# Patient Record
Sex: MALE | Race: WHITE | NOT HISPANIC OR LATINO | URBAN - METROPOLITAN AREA
[De-identification: names, ages, dates, MRNs, and addresses within clinical notes are randomized per-mention and may not be internally consistent; named-entity substitution may affect disease eponyms.]

---

## 2023-09-28 LAB
ALANINE AMINOTRANSFERASE (SGPT) (U/L) IN SER/PLAS: 9 U/L (ref 10–52)
ALBUMIN (G/DL) IN SER/PLAS: 3.6 G/DL (ref 3.4–5)
ALKALINE PHOSPHATASE (U/L) IN SER/PLAS: 46 U/L (ref 33–136)
ANION GAP IN SER/PLAS: 9 MMOL/L (ref 10–20)
APPEARANCE, URINE: CLEAR
ASPARTATE AMINOTRANSFERASE (SGOT) (U/L) IN SER/PLAS: 22 U/L (ref 9–39)
BASOPHILS (10*3/UL) IN BLOOD BY AUTOMATED COUNT: 0.04 X10E9/L (ref 0–0.1)
BASOPHILS/100 LEUKOCYTES IN BLOOD BY AUTOMATED COUNT: 0.9 % (ref 0–2)
BILIRUBIN TOTAL (MG/DL) IN SER/PLAS: 1 MG/DL (ref 0–1.2)
BILIRUBIN, URINE: NEGATIVE
BLOOD, URINE: ABNORMAL
CALCIUM (MG/DL) IN SER/PLAS: 9.2 MG/DL (ref 8.6–10.3)
CARBON DIOXIDE, TOTAL (MMOL/L) IN SER/PLAS: 26 MMOL/L (ref 21–32)
CHLORIDE (MMOL/L) IN SER/PLAS: 91 MMOL/L (ref 98–107)
COLOR, URINE: YELLOW
CREATININE (MG/DL) IN SER/PLAS: 0.69 MG/DL (ref 0.5–1.3)
EOSINOPHILS (10*3/UL) IN BLOOD BY AUTOMATED COUNT: 0.33 X10E9/L (ref 0–0.7)
EOSINOPHILS/100 LEUKOCYTES IN BLOOD BY AUTOMATED COUNT: 7.7 % (ref 0–6)
ERYTHROCYTE DISTRIBUTION WIDTH (RATIO) BY AUTOMATED COUNT: 12.2 % (ref 11.5–14.5)
ERYTHROCYTE MEAN CORPUSCULAR HEMOGLOBIN CONCENTRATION (G/DL) BY AUTOMATED: 34.3 G/DL (ref 32–36)
ERYTHROCYTE MEAN CORPUSCULAR VOLUME (FL) BY AUTOMATED COUNT: 87 FL (ref 80–100)
ERYTHROCYTES (10*6/UL) IN BLOOD BY AUTOMATED COUNT: 4.49 X10E12/L (ref 4.5–5.9)
GFR MALE: >90 ML/MIN/1.73M2
GLUCOSE (MG/DL) IN SER/PLAS: 84 MG/DL (ref 74–99)
GLUCOSE, URINE: NEGATIVE MG/DL
HEMATOCRIT (%) IN BLOOD BY AUTOMATED COUNT: 39.1 % (ref 41–52)
HEMOGLOBIN (G/DL) IN BLOOD: 13.4 G/DL (ref 13.5–17.5)
IMMATURE GRANULOCYTES/100 LEUKOCYTES IN BLOOD BY AUTOMATED COUNT: 0.2 % (ref 0–0.9)
KETONES, URINE: NEGATIVE MG/DL
LEUKOCYTE ESTERASE, URINE: NEGATIVE
LEUKOCYTES (10*3/UL) IN BLOOD BY AUTOMATED COUNT: 4.3 X10E9/L (ref 4.4–11.3)
LYMPHOCYTES (10*3/UL) IN BLOOD BY AUTOMATED COUNT: 1.67 X10E9/L (ref 1.2–4.8)
LYMPHOCYTES/100 LEUKOCYTES IN BLOOD BY AUTOMATED COUNT: 38.9 % (ref 13–44)
MAGNESIUM (MG/DL) IN SER/PLAS: 1.35 MG/DL (ref 1.6–2.4)
MONOCYTES (10*3/UL) IN BLOOD BY AUTOMATED COUNT: 0.73 X10E9/L (ref 0.1–1)
MONOCYTES/100 LEUKOCYTES IN BLOOD BY AUTOMATED COUNT: 17 % (ref 2–10)
NATRIURETIC PEPTIDE B (PG/ML) IN SER/PLAS: 26 PG/ML (ref 0–99)
NEUTROPHILS (10*3/UL) IN BLOOD BY AUTOMATED COUNT: 1.51 X10E9/L (ref 1.2–7.7)
NEUTROPHILS/100 LEUKOCYTES IN BLOOD BY AUTOMATED COUNT: 35.3 % (ref 40–80)
NITRITE, URINE: NEGATIVE
NRBC (PER 100 WBCS) BY AUTOMATED COUNT: 0 /100 WBC (ref 0–0)
PH, URINE: 6 (ref 5–8)
PLATELETS (10*3/UL) IN BLOOD AUTOMATED COUNT: 176 X10E9/L (ref 150–450)
POTASSIUM (MMOL/L) IN SER/PLAS: 4 MMOL/L (ref 3.5–5.3)
PROTEIN TOTAL: 6.5 G/DL (ref 6.4–8.2)
PROTEIN, URINE: NEGATIVE MG/DL
RBC, URINE: NORMAL /HPF (ref 0–5)
SODIUM (MMOL/L) IN SER/PLAS: 122 MMOL/L (ref 136–145)
SPECIFIC GRAVITY, URINE: 1 (ref 1–1.03)
TROPONIN I, HIGH SENSITIVITY: 8 NG/L (ref 0–20)
UREA NITROGEN (MG/DL) IN SER/PLAS: 6 MG/DL (ref 6–23)
UROBILINOGEN, URINE: <2 MG/DL (ref 0–1.9)
WBC, URINE: NORMAL /HPF (ref 0–5)

## 2023-09-28 PROCEDURE — 85025 COMPLETE CBC W/AUTO DIFF WBC: CPT

## 2023-09-28 PROCEDURE — 73502 X-RAY EXAM HIP UNI 2-3 VIEWS: CPT | Mod: LT

## 2023-09-28 PROCEDURE — 96365 THER/PROPH/DIAG IV INF INIT: CPT

## 2023-09-28 PROCEDURE — 96375 TX/PRO/DX INJ NEW DRUG ADDON: CPT

## 2023-09-28 PROCEDURE — 96366 THER/PROPH/DIAG IV INF ADDON: CPT

## 2023-09-28 PROCEDURE — 87635 SARS-COV-2 COVID-19 AMP PRB: CPT

## 2023-09-28 PROCEDURE — 72125 CT NECK SPINE W/O DYE: CPT

## 2023-09-28 PROCEDURE — 70450 CT HEAD/BRAIN W/O DYE: CPT

## 2023-09-28 PROCEDURE — 83880 ASSAY OF NATRIURETIC PEPTIDE: CPT

## 2023-09-28 PROCEDURE — 83735 ASSAY OF MAGNESIUM: CPT

## 2023-09-28 PROCEDURE — 71045 X-RAY EXAM CHEST 1 VIEW: CPT

## 2023-09-28 PROCEDURE — 9990 CHARGE CONVERSION: Mod: RT

## 2023-09-28 PROCEDURE — 80053 COMPREHEN METABOLIC PANEL: CPT

## 2023-09-28 PROCEDURE — 81001 URINALYSIS AUTO W/SCOPE: CPT

## 2023-09-28 PROCEDURE — 73562 X-RAY EXAM OF KNEE 3: CPT | Mod: RT

## 2023-09-28 PROCEDURE — 84484 ASSAY OF TROPONIN QUANT: CPT | Mod: 91

## 2023-09-28 PROCEDURE — 93005 ELECTROCARDIOGRAM TRACING: CPT

## 2023-09-28 PROCEDURE — 99285 EMERGENCY DEPT VISIT HI MDM: CPT

## 2023-09-29 ENCOUNTER — HOSPITAL ENCOUNTER (INPATIENT)
Dept: DATA CONVERSION | Facility: HOSPITAL | Age: 63
LOS: 6 days | Discharge: NURSING, REHAB OR LONG-TERM CARE | DRG: 644 | End: 2023-10-05
Attending: INTERNAL MEDICINE | Admitting: INTERNAL MEDICINE
Payer: MEDICARE

## 2023-09-29 DIAGNOSIS — E22.2 SIADH (SYNDROME OF INAPPROPRIATE ADH PRODUCTION) (MULTI): ICD-10-CM

## 2023-09-29 DIAGNOSIS — R53.1 WEAKNESS: Primary | ICD-10-CM

## 2023-09-29 DIAGNOSIS — E83.42 HYPOMAGNESEMIA: ICD-10-CM

## 2023-09-29 LAB
ANION GAP IN SER/PLAS: 11 MMOL/L (ref 10–20)
ANION GAP IN SER/PLAS: 11 MMOL/L (ref 10–20)
ANION GAP IN SER/PLAS: 8 MMOL/L (ref 10–20)
ANION GAP IN SER/PLAS: NORMAL
ATRIAL RATE: 89 BPM
CALCIUM (MG/DL) IN SER/PLAS: 8.9 MG/DL (ref 8.6–10.3)
CALCIUM (MG/DL) IN SER/PLAS: 9 MG/DL (ref 8.6–10.3)
CALCIUM (MG/DL) IN SER/PLAS: 9.1 MG/DL (ref 8.6–10.3)
CALCIUM (MG/DL) IN SER/PLAS: NORMAL
CARBON DIOXIDE, TOTAL (MMOL/L) IN SER/PLAS: 22 MMOL/L (ref 21–32)
CARBON DIOXIDE, TOTAL (MMOL/L) IN SER/PLAS: 23 MMOL/L (ref 21–32)
CARBON DIOXIDE, TOTAL (MMOL/L) IN SER/PLAS: 27 MMOL/L (ref 21–32)
CARBON DIOXIDE, TOTAL (MMOL/L) IN SER/PLAS: NORMAL
CHLORIDE (MMOL/L) IN SER/PLAS: 100 MMOL/L (ref 98–107)
CHLORIDE (MMOL/L) IN SER/PLAS: 101 MMOL/L (ref 98–107)
CHLORIDE (MMOL/L) IN SER/PLAS: 101 MMOL/L (ref 98–107)
CHLORIDE (MMOL/L) IN SER/PLAS: NORMAL
CREATININE (MG/DL) IN SER/PLAS: 0.6 MG/DL (ref 0.5–1.3)
CREATININE (MG/DL) IN SER/PLAS: 0.62 MG/DL (ref 0.5–1.3)
CREATININE (MG/DL) IN SER/PLAS: 0.75 MG/DL (ref 0.5–1.3)
CREATININE (MG/DL) IN SER/PLAS: NORMAL
CREATININE (MG/DL) IN URINE: 127 MG/DL (ref 20–370)
GFR FEMALE: NORMAL
GFR MALE: >90 ML/MIN/1.73M2
GFR MALE: NORMAL
GLUCOSE (MG/DL) IN SER/PLAS: 103 MG/DL (ref 74–99)
GLUCOSE (MG/DL) IN SER/PLAS: 80 MG/DL (ref 74–99)
GLUCOSE (MG/DL) IN SER/PLAS: 80 MG/DL (ref 74–99)
GLUCOSE (MG/DL) IN SER/PLAS: NORMAL
OSMOLALITY, RANDOM URINE: 277 MOSM/KG (ref 200–1200)
OSMOLALITY, SERUM: 267 MOSM/KG H2O (ref 280–300)
P AXIS: 75 DEGREES
P OFFSET: 159 MS
P ONSET: 94 MS
PHOSPHATE (MG/DL) IN SER/PLAS: 4.2 MG/DL (ref 2.5–4.9)
POTASSIUM (MMOL/L) IN SER/PLAS: 3.7 MMOL/L (ref 3.5–5.3)
POTASSIUM (MMOL/L) IN SER/PLAS: 3.7 MMOL/L (ref 3.5–5.3)
POTASSIUM (MMOL/L) IN SER/PLAS: 4.1 MMOL/L (ref 3.5–5.3)
POTASSIUM (MMOL/L) IN SER/PLAS: NORMAL
PR INTERVAL: 264 MS
PROCALCITONIN: 0.02 NG/ML
Q ONSET: 226 MS
QRS COUNT: 15 BEATS
QRS DURATION: 86 MS
QT INTERVAL: 366 MS
QTC CALCULATION(BAZETT): 445 MS
QTC FREDERICIA: 417 MS
R AXIS: 126 DEGREES
SARS-COV-2 RESULT: NOT DETECTED
SODIUM (MMOL/L) IN SER/PLAS: 129 MMOL/L (ref 136–145)
SODIUM (MMOL/L) IN SER/PLAS: 130 MMOL/L (ref 136–145)
SODIUM (MMOL/L) IN SER/PLAS: 131 MMOL/L (ref 136–145)
SODIUM (MMOL/L) IN SER/PLAS: 132 MMOL/L (ref 136–145)
SODIUM (MMOL/L) IN SER/PLAS: NORMAL
SODIUM (MMOL/L) IN SER/PLAS: NORMAL
SODIUM URINE RANDOM: 63 MMOL/L
SODIUM/CREATININE (MMOL/G) IN URINE: 50 MMOL/G CREAT
T AXIS: 47 DEGREES
T OFFSET: 409 MS
TROPONIN I, HIGH SENSITIVITY: 7 NG/L (ref 0–20)
TROPONIN I, HIGH SENSITIVITY: NORMAL
UREA NITROGEN (MG/DL) IN SER/PLAS: 3 MG/DL (ref 6–23)
UREA NITROGEN (MG/DL) IN SER/PLAS: NORMAL
VENTRICULAR RATE: 89 BPM

## 2023-09-29 PROCEDURE — 84484 ASSAY OF TROPONIN QUANT: CPT

## 2023-09-29 PROCEDURE — 83880 ASSAY OF NATRIURETIC PEPTIDE: CPT

## 2023-09-29 PROCEDURE — 96366 THER/PROPH/DIAG IV INF ADDON: CPT

## 2023-09-29 PROCEDURE — 96365 THER/PROPH/DIAG IV INF INIT: CPT

## 2023-09-29 PROCEDURE — 83935 ASSAY OF URINE OSMOLALITY: CPT

## 2023-09-29 PROCEDURE — 72125 CT NECK SPINE W/O DYE: CPT

## 2023-09-29 PROCEDURE — 99285 EMERGENCY DEPT VISIT HI MDM: CPT

## 2023-09-29 PROCEDURE — 97530 THERAPEUTIC ACTIVITIES: CPT | Mod: GP

## 2023-09-29 PROCEDURE — 84145 PROCALCITONIN (PCT): CPT | Mod: 90

## 2023-09-29 PROCEDURE — 97165 OT EVAL LOW COMPLEX 30 MIN: CPT | Mod: GO

## 2023-09-29 PROCEDURE — 97112 NEUROMUSCULAR REEDUCATION: CPT | Mod: GO

## 2023-09-29 PROCEDURE — 97161 PT EVAL LOW COMPLEX 20 MIN: CPT | Mod: GP

## 2023-09-29 PROCEDURE — 84300 ASSAY OF URINE SODIUM: CPT

## 2023-09-29 PROCEDURE — 85025 COMPLETE CBC W/AUTO DIFF WBC: CPT

## 2023-09-29 PROCEDURE — 9990 CHARGE CONVERSION: Mod: 90

## 2023-09-29 PROCEDURE — 94640 AIRWAY INHALATION TREATMENT: CPT

## 2023-09-29 PROCEDURE — 73502 X-RAY EXAM HIP UNI 2-3 VIEWS: CPT | Mod: LT

## 2023-09-29 PROCEDURE — 71045 X-RAY EXAM CHEST 1 VIEW: CPT

## 2023-09-29 PROCEDURE — 80053 COMPREHEN METABOLIC PANEL: CPT

## 2023-09-29 PROCEDURE — 93005 ELECTROCARDIOGRAM TRACING: CPT

## 2023-09-29 PROCEDURE — 84100 ASSAY OF PHOSPHORUS: CPT

## 2023-09-29 PROCEDURE — 70450 CT HEAD/BRAIN W/O DYE: CPT

## 2023-09-29 PROCEDURE — 81001 URINALYSIS AUTO W/SCOPE: CPT

## 2023-09-29 PROCEDURE — 96375 TX/PRO/DX INJ NEW DRUG ADDON: CPT

## 2023-09-29 PROCEDURE — 82570 ASSAY OF URINE CREATININE: CPT

## 2023-09-29 PROCEDURE — 83930 ASSAY OF BLOOD OSMOLALITY: CPT

## 2023-09-29 PROCEDURE — 87635 SARS-COV-2 COVID-19 AMP PRB: CPT

## 2023-09-29 PROCEDURE — 80048 BASIC METABOLIC PNL TOTAL CA: CPT | Mod: 91

## 2023-09-29 PROCEDURE — 84295 ASSAY OF SERUM SODIUM: CPT | Mod: 91

## 2023-09-29 PROCEDURE — 36415 COLL VENOUS BLD VENIPUNCTURE: CPT | Mod: 91

## 2023-09-29 PROCEDURE — 83735 ASSAY OF MAGNESIUM: CPT

## 2023-09-29 PROCEDURE — 73562 X-RAY EXAM OF KNEE 3: CPT | Mod: RT

## 2023-09-29 RX ORDER — FLUTICASONE FUROATE AND VILANTEROL 200; 25 UG/1; UG/1
1 POWDER RESPIRATORY (INHALATION) DAILY
Status: DISCONTINUED | OUTPATIENT
Start: 2023-09-30 | End: 2023-09-30

## 2023-09-29 RX ORDER — ATORVASTATIN CALCIUM 40 MG/1
40 TABLET, FILM COATED ORAL DAILY
Status: DISCONTINUED | OUTPATIENT
Start: 2023-09-30 | End: 2023-10-01

## 2023-09-29 RX ORDER — TOLTERODINE TARTRATE 1 MG/1
1 TABLET, EXTENDED RELEASE ORAL 2 TIMES DAILY
COMMUNITY

## 2023-09-29 RX ORDER — POTASSIUM CHLORIDE 20 MEQ/1
20 TABLET, EXTENDED RELEASE ORAL DAILY
Status: DISCONTINUED | OUTPATIENT
Start: 2023-09-30 | End: 2023-09-30

## 2023-09-29 RX ORDER — BUDESONIDE 0.5 MG/2ML
0.5 INHALANT ORAL
Status: DISCONTINUED | OUTPATIENT
Start: 2023-09-30 | End: 2023-10-05 | Stop reason: HOSPADM

## 2023-09-29 RX ORDER — OXYCODONE HYDROCHLORIDE 10 MG/1
10 TABLET ORAL EVERY 8 HOURS PRN
Status: DISCONTINUED | OUTPATIENT
Start: 2023-09-30 | End: 2023-10-05 | Stop reason: HOSPADM

## 2023-09-29 RX ORDER — LIDOCAINE 560 MG/1
1 PATCH PERCUTANEOUS; TOPICAL; TRANSDERMAL DAILY
Status: DISCONTINUED | OUTPATIENT
Start: 2023-09-30 | End: 2023-10-05 | Stop reason: HOSPADM

## 2023-09-29 RX ORDER — PREGABALIN 150 MG/1
150 CAPSULE ORAL 2 TIMES DAILY
COMMUNITY

## 2023-09-29 RX ORDER — OXYBUTYNIN CHLORIDE 5 MG/1
5 TABLET ORAL 2 TIMES DAILY
Status: DISCONTINUED | OUTPATIENT
Start: 2023-09-30 | End: 2023-10-05 | Stop reason: HOSPADM

## 2023-09-29 RX ORDER — PANTOPRAZOLE SODIUM 40 MG/1
40 TABLET, DELAYED RELEASE ORAL
Status: DISCONTINUED | OUTPATIENT
Start: 2023-09-30 | End: 2023-09-30

## 2023-09-29 RX ORDER — AMOXICILLIN 250 MG
2 CAPSULE ORAL 2 TIMES DAILY PRN
Status: DISCONTINUED | OUTPATIENT
Start: 2023-09-30 | End: 2023-10-01

## 2023-09-29 RX ORDER — IBUPROFEN 200 MG
1 TABLET ORAL DAILY
Status: DISCONTINUED | OUTPATIENT
Start: 2023-09-30 | End: 2023-10-05 | Stop reason: HOSPADM

## 2023-09-29 RX ORDER — BUDESONIDE, GLYCOPYRROLATE, AND FORMOTEROL FUMARATE 160; 9; 4.8 UG/1; UG/1; UG/1
2 AEROSOL, METERED RESPIRATORY (INHALATION)
COMMUNITY

## 2023-09-29 RX ORDER — METOPROLOL SUCCINATE 25 MG/1
25 TABLET, EXTENDED RELEASE ORAL DAILY
Status: DISCONTINUED | OUTPATIENT
Start: 2023-09-30 | End: 2023-10-01

## 2023-09-29 RX ORDER — DEXTROSE MONOHYDRATE 50 MG/ML
130 INJECTION, SOLUTION INTRAVENOUS CONTINUOUS
Status: DISCONTINUED | OUTPATIENT
Start: 2023-09-30 | End: 2023-09-30

## 2023-09-29 RX ORDER — ATORVASTATIN CALCIUM 40 MG/1
40 TABLET, FILM COATED ORAL DAILY
COMMUNITY

## 2023-09-29 RX ORDER — OXYCODONE HYDROCHLORIDE 5 MG/1
5 TABLET ORAL EVERY 8 HOURS PRN
Status: DISCONTINUED | OUTPATIENT
Start: 2023-09-30 | End: 2023-10-01

## 2023-09-29 RX ORDER — FLUTICASONE PROPIONATE 50 MCG
1 SPRAY, SUSPENSION (ML) NASAL DAILY
COMMUNITY

## 2023-09-29 RX ORDER — SILDENAFIL 100 MG/1
100 TABLET, FILM COATED ORAL DAILY PRN
COMMUNITY

## 2023-09-29 RX ORDER — PREGABALIN 150 MG/1
150 CAPSULE ORAL 2 TIMES DAILY
Status: DISCONTINUED | OUTPATIENT
Start: 2023-09-30 | End: 2023-10-01

## 2023-09-29 RX ORDER — LORATADINE 10 MG/1
10 TABLET ORAL DAILY
COMMUNITY

## 2023-09-29 RX ORDER — ALBUTEROL SULFATE 90 UG/1
2 AEROSOL, METERED RESPIRATORY (INHALATION) EVERY 6 HOURS PRN
COMMUNITY

## 2023-09-29 RX ORDER — ACETAMINOPHEN 325 MG/1
650 TABLET ORAL EVERY 6 HOURS PRN
Status: DISCONTINUED | OUTPATIENT
Start: 2023-09-30 | End: 2023-10-05 | Stop reason: HOSPADM

## 2023-09-29 RX ORDER — METOPROLOL SUCCINATE 25 MG/1
25 TABLET, EXTENDED RELEASE ORAL DAILY
COMMUNITY

## 2023-09-29 RX ORDER — ALBUTEROL SULFATE 0.83 MG/ML
3 SOLUTION RESPIRATORY (INHALATION) EVERY 6 HOURS PRN
Status: DISCONTINUED | OUTPATIENT
Start: 2023-09-30 | End: 2023-10-01

## 2023-09-29 RX ORDER — LORATADINE 10 MG/1
10 TABLET ORAL DAILY
Status: DISCONTINUED | OUTPATIENT
Start: 2023-09-30 | End: 2023-10-05 | Stop reason: HOSPADM

## 2023-09-29 RX ORDER — FLUTICASONE PROPIONATE 50 MCG
2 SPRAY, SUSPENSION (ML) NASAL DAILY
Status: DISCONTINUED | OUTPATIENT
Start: 2023-09-30 | End: 2023-09-30

## 2023-09-29 RX ORDER — OMEPRAZOLE 20 MG/1
20 CAPSULE, DELAYED RELEASE ORAL DAILY
COMMUNITY

## 2023-09-29 RX ORDER — OXYCODONE HYDROCHLORIDE 10 MG/1
10 TABLET ORAL EVERY 8 HOURS PRN
COMMUNITY

## 2023-09-29 RX ORDER — AMOXICILLIN 250 MG
2 CAPSULE ORAL 2 TIMES DAILY PRN
COMMUNITY

## 2023-09-30 LAB
ALBUMIN SERPL BCP-MCNC: 3.2 G/DL (ref 3.4–5)
ANION GAP SERPL CALC-SCNC: 11 MMOL/L (ref 10–20)
ANION GAP SERPL CALC-SCNC: 12 MMOL/L
ANION GAP SERPL CALC-SCNC: 13 MMOL/L (ref 10–20)
BUN SERPL-MCNC: 4 MG/DL (ref 6–23)
BUN SERPL-MCNC: 4 MG/DL (ref 6–23)
BUN SERPL-MCNC: 5 MG/DL (ref 6–23)
CALCIUM SERPL-MCNC: 9 MG/DL (ref 8.6–10.3)
CALCIUM SERPL-MCNC: 9.2 MG/DL (ref 8.6–10.3)
CALCIUM SERPL-MCNC: 9.3 MG/DL (ref 8.6–10.3)
CHLORIDE SERPL-SCNC: 100 MMOL/L (ref 98–107)
CHLORIDE SERPL-SCNC: 101 MMOL/L (ref 98–107)
CHLORIDE SERPL-SCNC: 103 MMOL/L (ref 98–107)
CO2 SERPL-SCNC: 20 MMOL/L (ref 21–32)
CO2 SERPL-SCNC: 22 MMOL/L (ref 21–32)
CO2 SERPL-SCNC: 24 MMOL/L (ref 21–32)
CREAT SERPL-MCNC: 0.69 MG/DL (ref 0.5–1.3)
CREAT SERPL-MCNC: 0.7 MG/DL (ref 0.5–1.3)
CREAT SERPL-MCNC: 0.77 MG/DL (ref 0.5–1.3)
ERYTHROCYTE [DISTWIDTH] IN BLOOD BY AUTOMATED COUNT: 12.5 % (ref 11.5–14.5)
GFR SERPL CREATININE-BSD FRML MDRD: >90 ML/MIN/1.73M*2
GLUCOSE SERPL-MCNC: 114 MG/DL (ref 74–99)
GLUCOSE SERPL-MCNC: 87 MG/DL (ref 74–99)
GLUCOSE SERPL-MCNC: 92 MG/DL (ref 74–99)
HCT VFR BLD AUTO: 42 % (ref 41–52)
HGB BLD-MCNC: 13.7 G/DL (ref 13.5–17.5)
LACTATE SERPL-SCNC: 1.1 MMOL/L (ref 0.4–2)
MAGNESIUM SERPL-MCNC: 1.4 MG/DL (ref 1.6–2.4)
MCH RBC QN AUTO: 30 PG (ref 26–34)
MCHC RBC AUTO-ENTMCNC: 32.6 G/DL (ref 32–36)
MCV RBC AUTO: 92 FL (ref 80–100)
NRBC BLD-RTO: 0 /100 WBCS (ref 0–0)
OSMOLALITY, RANDOM URINE: NORMAL
PHOSPHATE SERPL-MCNC: 3.9 MG/DL (ref 2.5–4.9)
PLATELET # BLD AUTO: 177 X10*3/UL (ref 150–450)
PMV BLD AUTO: 10.3 FL (ref 7.5–11.5)
POTASSIUM SERPL-SCNC: 4.1 MMOL/L (ref 3.5–5.3)
POTASSIUM SERPL-SCNC: 4.2 MMOL/L (ref 3.5–5.3)
POTASSIUM SERPL-SCNC: 4.6 MMOL/L (ref 3.5–5.3)
RBC # BLD AUTO: 4.56 X10*6/UL (ref 4.5–5.9)
SODIUM SERPL-SCNC: 129 MMOL/L (ref 136–145)
SODIUM SERPL-SCNC: 130 MMOL/L (ref 136–145)
SODIUM SERPL-SCNC: 134 MMOL/L (ref 136–145)
TSH SERPL-ACNC: 4 MIU/L (ref 0.44–3.98)
WBC # BLD AUTO: 5 X10*3/UL (ref 4.4–11.3)

## 2023-09-30 PROCEDURE — 84443 ASSAY THYROID STIM HORMONE: CPT | Performed by: INTERNAL MEDICINE

## 2023-09-30 PROCEDURE — 84300 ASSAY OF URINE SODIUM: CPT

## 2023-09-30 PROCEDURE — 83935 ASSAY OF URINE OSMOLALITY: CPT

## 2023-09-30 PROCEDURE — 83605 ASSAY OF LACTIC ACID: CPT | Performed by: INTERNAL MEDICINE

## 2023-09-30 PROCEDURE — 83036 HEMOGLOBIN GLYCOSYLATED A1C: CPT | Performed by: INTERNAL MEDICINE

## 2023-09-30 PROCEDURE — 1200000002 HC GENERAL ROOM WITH TELEMETRY DAILY

## 2023-09-30 PROCEDURE — 80069 RENAL FUNCTION PANEL: CPT | Performed by: INTERNAL MEDICINE

## 2023-09-30 PROCEDURE — 36415 COLL VENOUS BLD VENIPUNCTURE: CPT

## 2023-09-30 PROCEDURE — 2500000004 HC RX 250 GENERAL PHARMACY W/ HCPCS (ALT 636 FOR OP/ED): Performed by: INTERNAL MEDICINE

## 2023-09-30 PROCEDURE — 82306 VITAMIN D 25 HYDROXY: CPT | Performed by: INTERNAL MEDICINE

## 2023-09-30 PROCEDURE — 83930 ASSAY OF BLOOD OSMOLALITY: CPT

## 2023-09-30 PROCEDURE — 2500000001 HC RX 250 WO HCPCS SELF ADMINISTERED DRUGS (ALT 637 FOR MEDICARE OP)

## 2023-09-30 PROCEDURE — 9990 CHARGE CONVERSION: Mod: 90

## 2023-09-30 PROCEDURE — 85027 COMPLETE CBC AUTOMATED: CPT | Performed by: NURSE PRACTITIONER

## 2023-09-30 PROCEDURE — 82570 ASSAY OF URINE CREATININE: CPT

## 2023-09-30 PROCEDURE — 2500000002 HC RX 250 W HCPCS SELF ADMINISTERED DRUGS (ALT 637 FOR MEDICARE OP, ALT 636 FOR OP/ED): Performed by: INTERNAL MEDICINE

## 2023-09-30 PROCEDURE — 80048 BASIC METABOLIC PNL TOTAL CA: CPT | Mod: 91

## 2023-09-30 PROCEDURE — 87040 BLOOD CULTURE FOR BACTERIA: CPT | Performed by: INTERNAL MEDICINE

## 2023-09-30 PROCEDURE — 83735 ASSAY OF MAGNESIUM: CPT | Performed by: INTERNAL MEDICINE

## 2023-09-30 PROCEDURE — 82533 TOTAL CORTISOL: CPT | Performed by: INTERNAL MEDICINE

## 2023-09-30 PROCEDURE — 84295 ASSAY OF SERUM SODIUM: CPT | Mod: 91

## 2023-09-30 PROCEDURE — 84100 ASSAY OF PHOSPHORUS: CPT

## 2023-09-30 PROCEDURE — 82607 VITAMIN B-12: CPT | Performed by: INTERNAL MEDICINE

## 2023-09-30 PROCEDURE — 82533 TOTAL CORTISOL: CPT | Mod: STJLAB | Performed by: INTERNAL MEDICINE

## 2023-09-30 PROCEDURE — 82746 ASSAY OF FOLIC ACID SERUM: CPT | Performed by: INTERNAL MEDICINE

## 2023-09-30 PROCEDURE — 84145 PROCALCITONIN (PCT): CPT | Mod: 90

## 2023-09-30 PROCEDURE — 80048 BASIC METABOLIC PNL TOTAL CA: CPT | Performed by: INTERNAL MEDICINE

## 2023-09-30 PROCEDURE — 2500000004 HC RX 250 GENERAL PHARMACY W/ HCPCS (ALT 636 FOR OP/ED): Performed by: NURSE PRACTITIONER

## 2023-09-30 PROCEDURE — 84145 PROCALCITONIN (PCT): CPT | Performed by: INTERNAL MEDICINE

## 2023-09-30 PROCEDURE — 3490 HC RX 250 GENERAL PHARMACY W/ HCPCS (ALT 636 FOR OP/ED): Performed by: INTERNAL MEDICINE

## 2023-09-30 PROCEDURE — 36415 COLL VENOUS BLD VENIPUNCTURE: CPT | Performed by: INTERNAL MEDICINE

## 2023-09-30 PROCEDURE — 94640 AIRWAY INHALATION TREATMENT: CPT

## 2023-09-30 PROCEDURE — 2500000001 HC RX 250 WO HCPCS SELF ADMINISTERED DRUGS (ALT 637 FOR MEDICARE OP): Performed by: INTERNAL MEDICINE

## 2023-09-30 RX ORDER — PANTOPRAZOLE SODIUM 20 MG/1
20 TABLET, DELAYED RELEASE ORAL
Status: DISCONTINUED | OUTPATIENT
Start: 2023-09-30 | End: 2023-10-01 | Stop reason: SDUPTHER

## 2023-09-30 RX ORDER — OXYCODONE HYDROCHLORIDE 10 MG/1
TABLET ORAL
Status: COMPLETED
Start: 2023-09-30 | End: 2023-09-30

## 2023-09-30 RX ORDER — MAGNESIUM SULFATE HEPTAHYDRATE 40 MG/ML
2 INJECTION, SOLUTION INTRAVENOUS ONCE
Status: COMPLETED | OUTPATIENT
Start: 2023-09-30 | End: 2023-10-01

## 2023-09-30 RX ORDER — IPRATROPIUM BROMIDE AND ALBUTEROL SULFATE 2.5; .5 MG/3ML; MG/3ML
3 SOLUTION RESPIRATORY (INHALATION)
Status: DISCONTINUED | OUTPATIENT
Start: 2023-09-30 | End: 2023-10-05 | Stop reason: HOSPADM

## 2023-09-30 RX ORDER — OXYCODONE HYDROCHLORIDE 10 MG/1
TABLET ORAL
Status: DISCONTINUED
Start: 2023-09-30 | End: 2023-09-30 | Stop reason: WASHOUT

## 2023-09-30 RX ORDER — IPRATROPIUM BROMIDE AND ALBUTEROL SULFATE 2.5; .5 MG/3ML; MG/3ML
3 SOLUTION RESPIRATORY (INHALATION)
Status: DISCONTINUED | OUTPATIENT
Start: 2023-09-30 | End: 2023-09-30

## 2023-09-30 RX ORDER — FLUTICASONE PROPIONATE 50 MCG
1 SPRAY, SUSPENSION (ML) NASAL DAILY
Status: DISCONTINUED | OUTPATIENT
Start: 2023-09-30 | End: 2023-10-01

## 2023-09-30 RX ORDER — SODIUM CHLORIDE 9 MG/ML
75 INJECTION, SOLUTION INTRAVENOUS CONTINUOUS
Status: DISCONTINUED | OUTPATIENT
Start: 2023-09-30 | End: 2023-10-01

## 2023-09-30 RX ADMIN — RIVAROXABAN 20 MG: 20 TABLET, FILM COATED ORAL at 17:00

## 2023-09-30 RX ADMIN — OXYCODONE HYDROCHLORIDE 10 MG: 10 TABLET ORAL at 05:55

## 2023-09-30 RX ADMIN — FLUTICASONE PROPIONATE 1 SPRAY: 50 SPRAY, METERED NASAL at 08:51

## 2023-09-30 RX ADMIN — OXYBUTYNIN CHLORIDE 5 MG: 5 TABLET ORAL at 21:10

## 2023-09-30 RX ADMIN — MAGNESIUM SULFATE HEPTAHYDRATE 2 G: 40 INJECTION, SOLUTION INTRAVENOUS at 23:17

## 2023-09-30 RX ADMIN — IPRATROPIUM BROMIDE AND ALBUTEROL SULFATE 3 ML: 2.5; .5 SOLUTION RESPIRATORY (INHALATION) at 20:41

## 2023-09-30 RX ADMIN — LORATADINE 10 MG: 10 TABLET ORAL at 08:50

## 2023-09-30 RX ADMIN — PANTOPRAZOLE SODIUM 20 MG: 20 TABLET, DELAYED RELEASE ORAL at 08:50

## 2023-09-30 RX ADMIN — BUDESONIDE 0.5 MG: 0.5 INHALANT RESPIRATORY (INHALATION) at 09:00

## 2023-09-30 RX ADMIN — OXYCODONE HYDROCHLORIDE 10 MG: 10 TABLET ORAL at 19:20

## 2023-09-30 RX ADMIN — PREGABALIN 150 MG: 150 CAPSULE ORAL at 08:54

## 2023-09-30 RX ADMIN — BUDESONIDE 0.5 MG: 0.5 INHALANT RESPIRATORY (INHALATION) at 20:40

## 2023-09-30 RX ADMIN — PREGABALIN 150 MG: 150 CAPSULE ORAL at 21:10

## 2023-09-30 RX ADMIN — OXYBUTYNIN CHLORIDE 5 MG: 5 TABLET ORAL at 08:51

## 2023-09-30 RX ADMIN — IPRATROPIUM BROMIDE AND ALBUTEROL SULFATE 3 ML: 2.5; .5 SOLUTION RESPIRATORY (INHALATION) at 09:00

## 2023-09-30 RX ADMIN — METOPROLOL SUCCINATE 25 MG: 25 TABLET, EXTENDED RELEASE ORAL at 08:47

## 2023-09-30 RX ADMIN — SODIUM CHLORIDE 75 ML/HR: 9 INJECTION, SOLUTION INTRAVENOUS at 20:30

## 2023-09-30 RX ADMIN — ATORVASTATIN CALCIUM 40 MG: 40 TABLET, FILM COATED ORAL at 08:50

## 2023-09-30 ASSESSMENT — PAIN SCALES - GENERAL
PAINLEVEL_OUTOF10: 8
PAINLEVEL_OUTOF10: 0 - NO PAIN
PAINLEVEL_OUTOF10: 4

## 2023-09-30 ASSESSMENT — PAIN - FUNCTIONAL ASSESSMENT
PAIN_FUNCTIONAL_ASSESSMENT: 0-10
PAIN_FUNCTIONAL_ASSESSMENT: 0-10

## 2023-09-30 NOTE — H&P
History of Present Illness:   HPI:    WILLIE GOTTLIEB is a 62 year old Male with pertinent medical history of lung cancer with mets to the rib bone treated with chemo and radiation on 2 separate occasions  (in remission for the last 2 years-[facility]), chronic pain, CAD, HTN, HLD, anemia, hx.  DVT and PE on Eliquis who presented to [facility] ED on 9/28/2023 with complaints of multiple falls while at home over the past month attributing  it to his right knee giving out.  Patient had a fall today in which he landed on his right knee.  He denies hitting his head.  Patient is on Eliquis for history of DVT and Right PE.  Over the last month he has had increased difficulty walking, which was  worse today after his fall.  He does use a walker to walk.  In addition, he reports bilateral hip pain right greater than left.  He is also had weight loss of approximately 55 pounds since July/August.  Patient reports having decreased appetite over the  last month with increased weakness.  Patient denies recent: fever, chills, headache, dizziness, chest pain/ palpitations, shortness of breath, cough, abdominal pain, N/V/D, dysuria, or hematuria.    ED course:    Initial VS: Temperature 36.7, HR 98, RR 20, /83, SPO2 98% on room air.  CBC: WBC 4.3.  CMP: Sodium 122, chloride 91, anion gap 9, magnesium 1.35.  CT head and C-spine: No acute intercranial abnormalities.  No acute cervical spine fracture.  X-ray right knee/left hip: No acute abnormalities noted.  Chest x-ray: Suspected bronchitis versus mild edema.  Small effusions not excluded.  While in the ED patient received Norco 1 tablet p.o., morphine 4 mg IV, magnesium 2 g IV, and normal saline x500 mL.    Patient admitted inpatient for hyponatremia and weakness consult for nephrology and oncology.    Past medical history:Lung cancer with mets to the rib bone treated with chemo and radiation (in remission for about 2 years), CAD, HTN, HLD, COPD, GERD, MI  in 2009 through [facility]-cardiac cath without stents, right PE, DVT, chronic pain, abdominal rectus  sheath hematoma, anemia    Past surgical history: Cardiac catheterization in 2009 without stents, cholecystectomy, hernia repair, right knee meniscus repair, embolization of left inferior epigastric artery 2020    Social history:Tobacco use of 1 pack/day, no alcohol usage, denies illicit drug use.    Family history: Mother: Lung cancer.  Maternal side: Heart disease.    Comorbidities:   Comorbidites:  ·  Comorbid Conditions chronic obstructive pulmonary disease, hypertension   ·  COPD unknown     Social History:   Social History:  Smoking Status moderate user (uses 11-30 cig/day, OR 0.5-1.5 ppd, OR 2-3 cans/pouches loose leaf tobacco per week, OR 0.5-1.5 vape pods per day)   Alcohol Use occasionally   Drug Use denies              Adverse Events:  ·  gabapentin : Swelling/Edema    Medications Prior to Admission:   Admission Medication Reconciliation has not been completed for this patient.    Review of Systems:   Constitutional: POSITIVE: Anorexia, Weight Loss;  NEGATIVE: Fever, Chills     Eyes: NEGATIVE: Blurry Vision, Redness, Vision Loss/  Change     ENMT: NEGATIVE: Nasal Discharge, Nasal Congestion,  Throat Pain     Respiratory: NEGATIVE: Dry Cough, Productive Cough,  Shortness of Breath     Cardiac: NEGATIVE: Chest Pain, Dyspnea on Exertion,  Palpitations     Gastrointestinal: NEGATIVE: Nausea, Vomiting, Diarrhea,  Constipation, Abdominal Pain     Genitourinary: NEGATIVE: Dysuria     Musculoskeletal: POSITIVE: Decreased ROM, Pain, Swelling, Weakness     Neurological: NEGATIVE: Dizziness, Headache     Psychiatric: NEGATIVE: Anxiety     Skin: NEGATIVE: Rash, Ulcer     Endocrine: NEGATIVE: Sweat     Hematologic/Lymph: NEGATIVE: Bruising     Allergic/Immunologic: NEGATIVE: Itching       Objective:     Objective Information:      T   P  R  BP   MAP  SpO2   Value  36.7  100  18  166/97      95%  Date/Time 9/28 19:27 9/28  23:08 9/28 23:08 9/28 23:08    9/28 23:08  Range  (36.7C - 36.7C )  (98 - 100 )  (18 - 20 )  (136 - 166 )/ (83 - 97 )    (95% - 98% )      Pain reported at 9/28 23:08: 5 = Moderate     Weights   9/28 19:27: Weight in lbs ((lbs))  291  9/28 19:27: Weight in kg (Weight (kg))  132  9/28 19:27: BMI (kg/m2) (BMI (kg/m2))  39.502      EKG: Sinus rhythm with first-degree AV block, ventricular rate 89, , QRS 86, QTc 445. No ST elevation or depression noted.     Physical Exam by System:    Constitutional: Awake/alert, calm, pleasant, and  cooperative.  Clear speech. no acute distress noted.   Eyes: PERRL, clear sclera, no swelling or draining  noted   ENMT: mucous membranes pink and moist, no apparent  injury, no lesions seen   Head/Neck: Neck supple, no apparent injury, trachea  midline, no palpable masses on head.   Respiratory/Thorax: CTAB, respirations even and unlabored,  symmetrical chest rise   Cardiovascular: Regular rhythm and rate, auscultated  S1 and S2, no murmurs   Gastrointestinal: Nondistended, soft, non-tender,  no rebound tenderness or guarding, positive bowel sounds in all quadrants   Musculoskeletal: Decreased range of motion and strength  in bilateral lower extremities, no joint swelling   Extremities: BLE 3-4+ pitting edema noted, no cyanosis,  1+ pulses of the extremities, see skin assessment for wounds   Neurological: alert and oriented x3, intact senses,  bilateral hand grasp equal and foot push/pulls extremely weak and equal.   Psychological: Appropriate mood and behavior   Skin: Pink, warm, and dry.  Bilateral lower extremities  with erythema and white scaly skin     Medications:    Medications:          Continuous Medications       --------------------------------  No continuous medications are active       Scheduled Medications       --------------------------------    1. Nicotine  21 mg/ 24 hour TransDermal - PEDS:  1  patch  TransDermal  Every 24 Hours         PRN Medications        --------------------------------    1. Acetaminophen:  650  mg  Oral  Every 6 Hours    2. oxyCODONE Immediate Release:  5  mg  Oral  Every 8 Hours    3. oxyCODONE Immediate Release:  10  mg  Oral  Every 8 Hours    4. Sodium Chloride 0.9% Injectable Flush:  10  mL  IntraVenous Flush  Every 8 Hours and as Needed        Recent Lab Results:    Results:        I have reviewed these laboratory results:    Troponin I, High Sensitivity  Trending View      Result 28-Sep-2023 23:40:00  28-Sep-2023 21:03:00    Troponin I, High Sensitivity 7   8        Coronavirus 2019 by PCR  28-Sep-2023 23:37:00      Result Value    Fluid Source  Nasal, Nasopharyngeal    Coronavirus 2019,PCR  NOT DETECTED  Reference Range: Not Detected .This test has received FDA Emergency Use Authorization (EUA) and has been verified by [facility]. This test is only authorized for the duration of time that circums      Urinalysis  28-Sep-2023 23:30:00      Result Value    Color, Urine  YELLOW  Reference Range: STRAW,YELLOW    Appearance, Urine  CLEAR    Specific Gravity, Urine  1.003   L   pH, Urine  6.0    Protein, Urine  NEGATIVE    Glucose, Urine  NEGATIVE    Blood, Urine  SMALL(1+)   A   Ketones, Urine  NEGATIVE    Bilirubin, Urine  NEGATIVE    Urobilinogen, Urine  <2.0    Nitrite, Urine  NEGATIVE    Leukocyte Esterase, Urine  NEGATIVE      Urinalysis, Microscopic  28-Sep-2023 23:30:00      Result Value    White Cells  0-5    Red Blood Cells  0-5      Complete Blood Count + Differential  28-Sep-2023 21:03:00      Result Value    White Blood Cell Count  4.3   L   Nucleated Erythrocyte Count  0.0    Red Blood Cell Count  4.49   L   HGB  13.4   L   HCT  39.1   L   MCV  87    MCHC  34.3    PLT  176    RDW-CV  12.2    Neutrophil %  35.3    Immature Granulocytes %  0.2    Lymphocyte %  38.9    Monocyte %  17.0    Eosinophil %  7.7    Basophil %  0.9    Neutrophil Count  1.51    Lymphocyte Count  1.67    Monocyte Count  0.73     Eosinophil Count  0.33    Basophil Count  0.04      Comprehensive Metabolic Panel  28-Sep-2023 21:03:00      Result Value    Glucose, Serum  84    NA  122   L   K  4.0    CL  91   L   Bicarbonate, Serum  26    Anion Gap, Serum  9   L   BUN  6    CREAT  0.69    GFR Male  >90    Calcium, Serum  9.2    ALB  3.6    ALKP  46    T Pro  6.5    T Bili  1.0    Alanine Aminotransferase, Serum  9   L   Aspartate Transaminase, Serum  22      Magnesium, Serum  28-Sep-2023 21:03:00      Result Value    Magnesium, Serum  1.35   L     Brain Natriuretic Peptide  28-Sep-2023 21:03:00      Result Value    Brain Natriuretic Peptide  26        Radiology Results:    Results:        Impression:     No fracture.        Signed by Eulogio Vitale MD     Xray Knee 3 View [Sep 28 2023 10:23PM]      Impression:     No acute abnormalities are identified in the pelvis or left hip..       Signed by Red Barnett MD     Xray Hip 2 View [Sep 28 2023 10:13PM]      Impression:    Suspect bronchitis versus mild edema. Small effusions not excluded.           Signed by Dima Nieves II, MD     Xray Chest 1 View [Sep 28 2023 10:13PM]      Impression:    No findings of acute intracranial abnormality.     No findings of acute cervical spine fracture. Straightening and mild  multilevel degenerative disc disease     Malpositioned right internal jugular venous catheter. Repositioning  advised     CT C Spine without Contrast [Sep 28 2023 10:13PM]      Impression:    No findings of acute intracranial abnormality.     No findings of acute cervical spine fracture. Straightening and mild  multilevel degenerative disc disease     Malpositioned right internal jugular venous catheter. Repositioning  advised     CT Head without Contrast [Sep 28 2023 10:13PM]      Impression:  Xray Knee 3 View [Sep 28 2023  9:54PM]      Impression:  Xray Knee 3 View [Sep 28 2023  9:51PM]      Assessment and Plan:   Problem List:       Admitting Dx:   Hyponatremia:     Assessment:     Hyponatremia/hypochloremia  Leukopenia  Hypomagnesia  Frequent falls  Generalized weakness  HTN/HLD  COPD  Nicotine dependence  HX.  Lung cancer with bone metastasis-in remission-not on any chemo.  Hx. DVT and PE on Eliquis    Plan:  Consult: Nephrology, oncology.  ATB: None at this time .   IVFs: Normal saline at 100 MLS per hour x1 L .  Meds: Nicotine transdermal patch 21 mg per 24 hours, Tylenol 650 mg p.o.  every 6 hours as needed for pain 1-3, oxycodone 5 mg p.o. every 8 hours as needed for pain 4-6, oxycodone 10 mg p.o. every 8 hours as needed for pain 7-10.  OARRS verified  Diet: Cardiac .  Telemetry monitoring .  Vital signs with BP, HR, & RR Q 4 hours.  Pulse ox Q 4 hours.   Admission height and weight.  Labs ordered: CBC, BMP [id number], magnesium, phosphorus, TSH, T4, hemoglobin  A1c, vitamin D, vitamin B12, folate, procalcitonin, CRP, serum osmolarity, urine osmolarity.  Test ordered: Ultrasound of BLE .  Monitor / trend labs while inpatient.  Replace electrolytes as needed.  Aspiration precautions .  Out of bed with assistance   Fall precautions  PT/OT eval and treat as indicated .  Encourage & educate on smoking cessation .  Call physician for temperature < 36.5 or >38.0 degrees celsius.  Call physician for pulse <50 or >120.  Call physician for respiratory rate <10 or >22.  Call physician for systolic blood pressure <90 or >170.  Call physician for diastolic blood pressure < 50 or >100.  Call physician for pulse ox <92%.  See orders for further plan of care ordered.     VTE prophylaxis: Continue home Eliquis, BLE SCDs.  Monitor for s/s of bleeding    Resume home medications as appropriate when pharmacy/nursing verify and complete patient's home medication reconciliation    Further evaluation, consults, and management per attending and consulting physicians.    This note has been transcribed using Dragon voice recognition system and there is a possibility of unintentional typing misprints.  Any  information found to be copied from previous providers is done in the best interest of the patient to provide accurate,  quality, and continuity of care.       Plan of Care Reviewed With:  Plan of Care Reviewed With: patient; daughter       Electronic Signatures:  Columba Dudley (APRN-CNP)  (Signed 29-Sep-2023 01:42)   Authored: History of Present Illness, Comorbidities,  Social History, Allergies, Medications Prior to Admission, Review of Systems, Objective, Assessment and Plan, Note Completion      Last Updated: 29-Sep-2023 01:42 by Columba Dudley (APRN-CNP)

## 2023-09-30 NOTE — CARE PLAN
Plan of care transcription     Problem: PT Problem  Goal: Pt will demonstrate mod I for all bed mobility   Description: Goal transcribed from [facility]    Outcome: Progressing  Goal: Pt will demonstrate mod I for all transfers with WW  Description: Goal transcribed from [facility]    Outcome: Progressing  Goal: Pt will ambulate 50 ft with WW and Tor.   Description: Goal transcribed from [facility]    Outcome: Progressing  Goal: Pt will be able to negotiation 7 steps with mod I.  Description: Goal transcribed from [facility]    Outcome: Progressing  Goal: Pt will demonstrate BLE strength WFLs  Description: Goal transcribed from [facility]    Outcome: Progressing   Physical Therapy Treatment    Patient Name: Jarocho Luis  MRN: [id number]  Today's Date: 9/30/2023          Assessment/Plan         PT Plan  Treatment/Interventions: Bed mobility, Transfer training, Gait training, Stair training, Balance training, Strengthening, Endurance training, Therapeutic exercise, Therapeutic activity, Home exercise program  PT Frequency: 3 times per week  PT Discharge Recommendations: Moderate intensity level of continued care      Encounter Problems       Encounter Problems (Active)       PT Problem       Pt will demonstrate mod I for all bed mobility  (Progressing)       Start:  09/30/23    Expected End:  10/13/23       Goal transcribed from [facility]           Pt will demonstrate mod I for all transfers with WW (Progressing)       Start:  09/30/23    Expected End:  10/13/23       Goal transcribed from [facility]           Pt will ambulate 50 ft with WW and Tor.  (Progressing)       Start:  09/30/23    Expected End:  10/13/23       Goal transcribed from [facility]           Pt will be able to negotiation 7 steps with mod I. (Progressing)       Start:  09/30/23    Expected End:  10/13/23       Goal transcribed from [facility]           Pt will demonstrate BLE strength WFLs (Progressing)       Start:  09/30/23    Expected End:  10/13/23       Goal transcribed from  [facility]

## 2023-10-01 PROBLEM — R53.1 WEAKNESS: Status: ACTIVE | Noted: 2023-10-01

## 2023-10-01 LAB
25(OH)D3 SERPL-MCNC: 37 NG/ML (ref 30–100)
ALBUMIN SERPL BCP-MCNC: 3.4 G/DL (ref 3.4–5)
ANION GAP SERPL CALC-SCNC: 13 MMOL/L (ref 10–20)
ANION GAP SERPL CALC-SCNC: 8 MMOL/L (ref 10–20)
BUN SERPL-MCNC: 5 MG/DL (ref 6–23)
BUN SERPL-MCNC: 6 MG/DL (ref 6–23)
CALCIDIOL (25 OH VITAMIN D3) (NG/ML) IN SER/PLAS: NORMAL
CALCIUM SERPL-MCNC: 9.1 MG/DL (ref 8.6–10.3)
CALCIUM SERPL-MCNC: 9.2 MG/DL (ref 8.6–10.3)
CHLORIDE SERPL-SCNC: 101 MMOL/L (ref 98–107)
CHLORIDE SERPL-SCNC: 102 MMOL/L (ref 98–107)
CO2 SERPL-SCNC: 22 MMOL/L (ref 21–32)
CO2 SERPL-SCNC: 25 MMOL/L (ref 21–32)
COBALAMIN (VITAMIN B12) (PG/ML) IN SER/PLAS: NORMAL
CORTIS SERPL-MCNC: 0.6 UG/DL (ref 2.5–20)
CREAT SERPL-MCNC: 0.69 MG/DL (ref 0.5–1.3)
CREAT SERPL-MCNC: 0.74 MG/DL (ref 0.5–1.3)
EST. AVERAGE GLUCOSE BLD GHB EST-MCNC: 88 MG/DL
FOLATE (NG/ML) IN SER/PLAS: NORMAL
FOLATE SERPL-MCNC: 7.3 NG/ML
GFR SERPL CREATININE-BSD FRML MDRD: >90 ML/MIN/1.73M*2
GFR SERPL CREATININE-BSD FRML MDRD: >90 ML/MIN/1.73M*2
GLUCOSE BLD MANUAL STRIP-MCNC: 96 MG/DL (ref 74–99)
GLUCOSE BLD MANUAL STRIP-MCNC: 98 MG/DL (ref 74–99)
GLUCOSE SERPL-MCNC: 96 MG/DL (ref 74–99)
GLUCOSE SERPL-MCNC: 98 MG/DL (ref 74–99)
HBA1C MFR BLD: 4.7 %
PHOSPHATE SERPL-MCNC: 3.5 MG/DL (ref 2.5–4.9)
POTASSIUM SERPL-SCNC: 4.1 MMOL/L (ref 3.5–5.3)
POTASSIUM SERPL-SCNC: 4.4 MMOL/L (ref 3.5–5.3)
PROCALCITONIN SERPL-MCNC: 0.03 NG/ML
SODIUM SERPL-SCNC: 131 MMOL/L (ref 136–145)
SODIUM SERPL-SCNC: 132 MMOL/L (ref 136–145)
THYROXINE (T4) (UG/DL) IN SER/PLAS: NORMAL
TSH SERPL-ACNC: 4.5 MIU/L (ref 0.44–3.98)
URATE SERPL-MCNC: 4.5 MG/DL (ref 4–7.5)
VIT B12 SERPL-MCNC: 239 PG/ML (ref 211–911)

## 2023-10-01 PROCEDURE — 36415 COLL VENOUS BLD VENIPUNCTURE: CPT | Performed by: INTERNAL MEDICINE

## 2023-10-01 PROCEDURE — 1200000002 HC GENERAL ROOM WITH TELEMETRY DAILY

## 2023-10-01 PROCEDURE — 82947 ASSAY GLUCOSE BLOOD QUANT: CPT

## 2023-10-01 PROCEDURE — 84443 ASSAY THYROID STIM HORMONE: CPT | Performed by: INTERNAL MEDICINE

## 2023-10-01 PROCEDURE — 83930 ASSAY OF BLOOD OSMOLALITY: CPT | Performed by: INTERNAL MEDICINE

## 2023-10-01 PROCEDURE — 94640 AIRWAY INHALATION TREATMENT: CPT

## 2023-10-01 PROCEDURE — 2500000001 HC RX 250 WO HCPCS SELF ADMINISTERED DRUGS (ALT 637 FOR MEDICARE OP): Performed by: INTERNAL MEDICINE

## 2023-10-01 PROCEDURE — 84550 ASSAY OF BLOOD/URIC ACID: CPT | Performed by: INTERNAL MEDICINE

## 2023-10-01 PROCEDURE — 80048 BASIC METABOLIC PNL TOTAL CA: CPT | Mod: CCI | Performed by: INTERNAL MEDICINE

## 2023-10-01 PROCEDURE — 3490 HC RX 250 GENERAL PHARMACY W/ HCPCS (ALT 636 FOR OP/ED): Performed by: INTERNAL MEDICINE

## 2023-10-01 PROCEDURE — 80069 RENAL FUNCTION PANEL: CPT | Performed by: INTERNAL MEDICINE

## 2023-10-01 PROCEDURE — 2500000002 HC RX 250 W HCPCS SELF ADMINISTERED DRUGS (ALT 637 FOR MEDICARE OP, ALT 636 FOR OP/ED): Performed by: INTERNAL MEDICINE

## 2023-10-01 PROCEDURE — 2500000004 HC RX 250 GENERAL PHARMACY W/ HCPCS (ALT 636 FOR OP/ED): Performed by: INTERNAL MEDICINE

## 2023-10-01 RX ORDER — ATORVASTATIN CALCIUM 40 MG/1
40 TABLET, FILM COATED ORAL NIGHTLY
Status: DISCONTINUED | OUTPATIENT
Start: 2023-10-01 | End: 2023-10-05 | Stop reason: HOSPADM

## 2023-10-01 RX ORDER — ALBUTEROL SULFATE 0.83 MG/ML
2.5 SOLUTION RESPIRATORY (INHALATION) EVERY 6 HOURS PRN
Status: DISCONTINUED | OUTPATIENT
Start: 2023-10-01 | End: 2023-10-05 | Stop reason: HOSPADM

## 2023-10-01 RX ORDER — ALBUTEROL SULFATE 90 UG/1
2 AEROSOL, METERED RESPIRATORY (INHALATION) EVERY 6 HOURS PRN
Status: DISCONTINUED | OUTPATIENT
Start: 2023-10-01 | End: 2023-10-01 | Stop reason: ALTCHOICE

## 2023-10-01 RX ORDER — LORATADINE 10 MG/1
10 TABLET ORAL DAILY
Status: DISCONTINUED | OUTPATIENT
Start: 2023-10-01 | End: 2023-10-01 | Stop reason: SDUPTHER

## 2023-10-01 RX ORDER — PANTOPRAZOLE SODIUM 40 MG/1
40 TABLET, DELAYED RELEASE ORAL
Status: DISCONTINUED | OUTPATIENT
Start: 2023-10-02 | End: 2023-10-05 | Stop reason: HOSPADM

## 2023-10-01 RX ORDER — FLUTICASONE PROPIONATE 50 MCG
1 SPRAY, SUSPENSION (ML) NASAL DAILY
Status: DISCONTINUED | OUTPATIENT
Start: 2023-10-01 | End: 2023-10-05 | Stop reason: HOSPADM

## 2023-10-01 RX ORDER — METOPROLOL SUCCINATE 25 MG/1
25 TABLET, EXTENDED RELEASE ORAL DAILY
Status: DISCONTINUED | OUTPATIENT
Start: 2023-10-01 | End: 2023-10-05 | Stop reason: HOSPADM

## 2023-10-01 RX ORDER — TOLTERODINE TARTRATE 1 MG/1
1 TABLET, EXTENDED RELEASE ORAL 2 TIMES DAILY
Status: DISCONTINUED | OUTPATIENT
Start: 2023-10-01 | End: 2023-10-01 | Stop reason: CLARIF

## 2023-10-01 RX ORDER — PREGABALIN 150 MG/1
150 CAPSULE ORAL 2 TIMES DAILY
Status: DISCONTINUED | OUTPATIENT
Start: 2023-10-01 | End: 2023-10-05 | Stop reason: HOSPADM

## 2023-10-01 RX ORDER — SILDENAFIL 100 MG/1
100 TABLET, FILM COATED ORAL DAILY PRN
Status: DISCONTINUED | OUTPATIENT
Start: 2023-10-01 | End: 2023-10-01 | Stop reason: CLARIF

## 2023-10-01 RX ORDER — FUROSEMIDE 20 MG/1
20 TABLET ORAL DAILY
Status: DISCONTINUED | OUTPATIENT
Start: 2023-10-01 | End: 2023-10-05 | Stop reason: HOSPADM

## 2023-10-01 RX ORDER — AMOXICILLIN 250 MG
2 CAPSULE ORAL NIGHTLY PRN
Status: DISCONTINUED | OUTPATIENT
Start: 2023-10-01 | End: 2023-10-05 | Stop reason: HOSPADM

## 2023-10-01 RX ORDER — OMEPRAZOLE 20 MG/1
20 CAPSULE, DELAYED RELEASE ORAL DAILY
Status: DISCONTINUED | OUTPATIENT
Start: 2023-10-01 | End: 2023-10-01 | Stop reason: SDUPTHER

## 2023-10-01 RX ORDER — OXYCODONE HYDROCHLORIDE 10 MG/1
10 TABLET ORAL EVERY 8 HOURS PRN
Status: DISCONTINUED | OUTPATIENT
Start: 2023-10-01 | End: 2023-10-05 | Stop reason: HOSPADM

## 2023-10-01 RX ADMIN — FUROSEMIDE 20 MG: 20 TABLET ORAL at 15:11

## 2023-10-01 RX ADMIN — PREGABALIN 150 MG: 150 CAPSULE ORAL at 09:00

## 2023-10-01 RX ADMIN — FLUTICASONE PROPIONATE 1 SPRAY: 50 SPRAY, METERED NASAL at 09:10

## 2023-10-01 RX ADMIN — RIVAROXABAN 20 MG: 20 TABLET, FILM COATED ORAL at 18:25

## 2023-10-01 RX ADMIN — PANTOPRAZOLE SODIUM 20 MG: 20 TABLET, DELAYED RELEASE ORAL at 09:09

## 2023-10-01 RX ADMIN — LORATADINE 10 MG: 10 TABLET ORAL at 09:01

## 2023-10-01 RX ADMIN — IPRATROPIUM BROMIDE AND ALBUTEROL SULFATE 3 ML: 2.5; .5 SOLUTION RESPIRATORY (INHALATION) at 14:00

## 2023-10-01 RX ADMIN — PREGABALIN 150 MG: 150 CAPSULE ORAL at 20:12

## 2023-10-01 RX ADMIN — OXYBUTYNIN CHLORIDE 5 MG: 5 TABLET ORAL at 09:00

## 2023-10-01 RX ADMIN — ATORVASTATIN CALCIUM 40 MG: 40 TABLET, FILM COATED ORAL at 09:00

## 2023-10-01 RX ADMIN — OXYCODONE HYDROCHLORIDE 5 MG: 5 TABLET ORAL at 08:59

## 2023-10-01 RX ADMIN — ATORVASTATIN CALCIUM 40 MG: 40 TABLET, FILM COATED ORAL at 20:13

## 2023-10-01 RX ADMIN — METOPROLOL SUCCINATE 25 MG: 25 TABLET, EXTENDED RELEASE ORAL at 09:00

## 2023-10-01 RX ADMIN — OXYCODONE HYDROCHLORIDE 10 MG: 10 TABLET ORAL at 20:13

## 2023-10-01 RX ADMIN — OXYBUTYNIN CHLORIDE 5 MG: 5 TABLET ORAL at 20:13

## 2023-10-01 ASSESSMENT — PAIN SCALES - GENERAL
PAINLEVEL_OUTOF10: 6
PAINLEVEL_OUTOF10: 5 - MODERATE PAIN

## 2023-10-01 NOTE — CARE PLAN
Occupational Therapy Treatment    Name: Jarocho Luis  MRN: [id number]  : 1960  Date: 10/01/23       Assessment:     Plan:  Treatment Interventions: ADL retraining  OT Frequency: 3 times per week  OT Discharge Recommendations: Moderate intensity level of continued care  Goals:  Encounter Problems       Encounter Problems (Active)       OT Goals       Patient will complete bed mobility with CGA (Progressing)       Start:  10/01/23    Expected End:  10/13/23       Transcribed from [facility]          Patient will complete all transfers with CGA with assisted device PRN (Progressing)       Start:  10/01/23    Expected End:  10/13/23       Transcribed from [facility]          Patient will complete B UE strengthening with resistance as tolerated to increase strength and endurance. (Progressing)       Start:  10/01/23    Expected End:  10/13/23       Transcribed from [facility]          Patient will complete all adls with independent assist. (Progressing)       Start:  10/01/23    Expected End:  10/13/23       Transcribed from [facility]                 Problem: OT Goals  Goal: Patient will complete bed mobility with CGA  Description: Transcribed from [facility]   Outcome: Progressing  Goal: Patient will complete all transfers with CGA with assisted device PRN  Description: Transcribed from [facility]   Outcome: Progressing  Goal: Patient will complete B UE strengthening with resistance as tolerated to increase strength and endurance.  Description: Transcribed from [facility]   Outcome: Progressing  Goal: Patient will complete all adls with independent assist.  Description: Transcribed from [facility]   Outcome: Progressing

## 2023-10-01 NOTE — PROGRESS NOTES
INPATIENT NEPHROLOGY CONSULT PROGRESS NOTES    Patient Name: Jarocho Luis   MRN: [id number]  CONSULTING SERVICE: Nephrology    Impression :   This is a 62-year-old male with past medical history of hypertension, hyperlipidemia, history of DVT and PE, on Eliquis, lung cancer status post chemo and radiation presented to the hospital with multiple falls and patient was found to have severe hyponatremia and nephrology consulted for further evaluation and management.     1.  Hyponatremia secondary to hypovolemia and poor solute intake, serum sodium has been improving.  2.  Hypomagnesemia.  3.  Anemia.  4.  History of lung cancer status post chemo and radiation.  5.  History of DVT and PE.  6.  Hyperlipidemia.  7.  Hypertension.    Plan.  -Serum sodium has been improving, up to 132 this morning.  Remains nonoliguric with urine output of 2.6 L in the last 24 hours.  -Patient has mild lower extremity edema, will discontinue IV hydration.  -Initial urine sodium is in 60s.  We will repeat urine sodium and urine osmolality.  -Will start on Lasix 20 mg p.o. daily.  -Hypertension: Blood pressure has been optimally controlled, continue metoprolol 25 mg p.o. daily.  -Family at bedside.  Updated.  Thank you for the consultation and will follow with you closely.    ================================================================  INTERVAL HPI: The patient was seen and examined. No acute event overnight.  Denies any complaints, chest pain, shortness of breath, nausea, vomiting, abdominal pain, headache, dizziness.  PERTINENT ROS:   GENERAL: No fever/chills.  RESPIRATORY: Negative for cough, wheezing or shortness of breath.  CARDIOVASCULAR: Negative for chest pain or palpitations.  GI: Negative for nausea, vomiting,  Diarrhea, abdominal pain.    : Negative for dysuria and hematuria    MEDICATIONS:  Current active Inpatient Medication reviewed.   Current Facility-Administered Medications   Medication Dose Route Frequency Provider  Last Rate Last Admin    acetaminophen (Tylenol) tablet 650 mg  650 mg oral q6h PRN Sony Andre MD        albuterol 2.5 mg /3 mL (0.083 %) nebulizer solution 3 mL  3 mL nebulization q6h PRN Sony Andre MD        atorvastatin (Lipitor) tablet 40 mg  40 mg oral Daily Sony Andre MD   40 mg at 10/01/23 0900    budesonide (Pulmicort) 0.5 mg/2 mL nebulizer solution 0.5 mg  0.5 mg nebulization BID Sony Andre MD   0.5 mg at 09/30/23 2040    fluticasone (Flonase) nasal spray 1 spray  1 spray Each Nostril Daily Sony Andre MD   1 spray at 10/01/23 0910    ipratropium-albuteroL (Duo-Neb) 0.5-2.5 mg/3 mL nebulizer solution 3 mL  3 mL nebulization TID Sony Andre MD   3 mL at 09/30/23 2041    lidocaine 4 % patch 1 patch  1 patch transdermal Daily Sony Andre MD        loratadine (Claritin) tablet 10 mg  10 mg oral Daily Sony Andre MD   10 mg at 10/01/23 0901    metoprolol succinate XL (Toprol-XL) 24 hr tablet 25 mg  25 mg oral Daily Sony Andre MD   25 mg at 10/01/23 0900    nicotine (Nicoderm CQ) 21 mg/24 hr patch 1 patch  1 patch transdermal Daily Sony Andre MD        oxybutynin (Ditropan) tablet 5 mg  5 mg oral BID Sony Andre MD   5 mg at 10/01/23 0900    oxyCODONE (Roxicodone) immediate release tablet 10 mg  10 mg oral q8h PRN Sony Andre MD   10 mg at 09/30/23 1920    oxyCODONE (Roxicodone) immediate release tablet 5 mg  5 mg oral q8h PRN Sony Andre MD   5 mg at 10/01/23 0859    pantoprazole (ProtoNix) EC tablet 20 mg  20 mg oral Daily before breakfast Sony Andre MD   20 mg at 10/01/23 0909    pregabalin (Lyrica) capsule 150 mg  150 mg oral BID Sony Andre MD   150 mg at 10/01/23 0900    rivaroxaban (Xarelto) tablet 20 mg  20 mg oral Daily with evening meal Sony Andre MD   20 mg at 09/30/23 1700    sennosides-docusate sodium (Brenda-Colace) 8.6-50 mg per tablet 2 tablet  2 tablet oral BID PRN  "Sony Andre MD           PHYSICAL EXAM:   /86   Pulse 101   Temp 36.4 °C (97.5 °F) (Temporal)   Resp 18   Ht 1.828 m (5' 11.97\")   Wt 135 kg (298 lb 11.6 oz)   SpO2 96%   BMI 40.55 kg/m²   Patient Vitals for the past 24 hrs:   BP   10/01/23 0700 155/86   09/30/23 2000 105/57   09/30/23 1600 114/59       Intake/Output Summary (Last 24 hours) at 10/1/2023 1337  Last data filed at 10/1/2023 0645  Gross per 24 hour   Intake 297 ml   Output 2000 ml   Net -1703 ml     GENERAL: Alert, no distress, cooperative  SKIN: Skin color, texture, turgor normal. No rashes or lesions.  HEAD/SINUSES: No significant findings  EYES: PE  OROPHARYNX: oral mucosa moist. Oropharynx normal.  NECK: No jugulovenous distention, No carotid bruits, Supple  LUNGS: Lungs clear to auscultation, no wheeze, rhonchi, or rales  CARDIAC: Normal S1 and S2; no rubs, murmurs, or gallops  ABDOMEN: Abdomen soft, non-tender, BS normal, No masses. No abdominal bruits.  EXTREMITIES: Normal exam of the extremities.   NEURO: No focal deficits. Able to move all 4 extremities.  PULSES: 2+ radial, 2 + femoral    DATA:   Diagnostic tests reviewed for today's visit:        Urobilinogen, Urine   Date Value Ref Range Status   09/28/2023 <2.0 0.0 - 1.9 mg/dL Final       No components found for: \"PROTTIMED\", \"UALBCR\", \"UPROT\", \"CREAT\"    No results found for: \"CHOL\", \"HDL\", \"LDL\"  IMAGING:  reviewed in  images    SIGNATURE: Franklin Frazier MD      "

## 2023-10-01 NOTE — H&P
History Of Present Illness  Jarocho Luis is a 62 y.o. M with PMH of lung ca with mets to ribs, s/p chemo and radiation, CAD, HTN, DVT and PE on Eliquis presented to ER due to multiple falls. Pt thinks his knees are giving out which is causing him fall. Pt is having increased difficulty ambulating. He has lost about 50 lb in last one yr.           Past Medical History  Ca lung, HTN, DVT/PE    Surgical History  He has a past surgical history that includes IR angiogram inferior epigastric (2/6/2020) and CT angio abdomen pelvis w and or wo IV IV contrast (2/8/2020).     Social History  He has no history on file for tobacco use, alcohol use, and drug use.    Family History  No family history on file.     Allergies  Gabapentin    Current medications:      Current Facility-Administered Medications:     acetaminophen (Tylenol) tablet 650 mg, 650 mg, oral, q6h PRN, Sony Andre MD    albuterol 2.5 mg /3 mL (0.083 %) nebulizer solution 3 mL, 3 mL, nebulization, q6h PRN, Sony Andre MD    atorvastatin (Lipitor) tablet 40 mg, 40 mg, oral, Daily, Sony Andre MD, 40 mg at 09/30/23 0850    budesonide (Pulmicort) 0.5 mg/2 mL nebulizer solution 0.5 mg, 0.5 mg, nebulization, BID, Sony Andre MD, 0.5 mg at 09/30/23 2040    fluticasone (Flonase) nasal spray 1 spray, 1 spray, Each Nostril, Daily, Sony Andre MD, 1 spray at 09/30/23 0851    ipratropium-albuteroL (Duo-Neb) 0.5-2.5 mg/3 mL nebulizer solution 3 mL, 3 mL, nebulization, TID, Sony Andre MD, 3 mL at 09/30/23 2041    lidocaine 4 % patch 1 patch, 1 patch, transdermal, Daily, Sony Andre MD    loratadine (Claritin) tablet 10 mg, 10 mg, oral, Daily, Sony Andre MD, 10 mg at 09/30/23 0850    magnesium sulfate IV 2 g, 2 g, intravenous, Once, Lesley Espinal APRN-CNP    metoprolol succinate XL (Toprol-XL) 24 hr tablet 25 mg, 25 mg, oral, Daily, Sony Andre MD, 25 mg at 09/30/23 0847    nicotine (Nicoderm CQ) 21  "mg/24 hr patch 1 patch, 1 patch, transdermal, Daily, Sony Andre MD    oxybutynin (Ditropan) tablet 5 mg, 5 mg, oral, BID, Sony Andre MD, 5 mg at 09/30/23 2110    oxyCODONE (Roxicodone) immediate release tablet 10 mg, 10 mg, oral, q8h PRN, Sony Andre MD, 10 mg at 09/30/23 1920    oxyCODONE (Roxicodone) immediate release tablet 5 mg, 5 mg, oral, q8h PRN, Sony Andre MD    pantoprazole (ProtoNix) EC tablet 20 mg, 20 mg, oral, Daily before breakfast, Sony Andre MD, 20 mg at 09/30/23 0850    pregabalin (Lyrica) capsule 150 mg, 150 mg, oral, BID, Sony Andre MD, 150 mg at 09/30/23 2110    rivaroxaban (Xarelto) tablet 20 mg, 20 mg, oral, Daily with evening meal, Sony Andre MD, 20 mg at 09/30/23 1700    sennosides-docusate sodium (Brenda-Colace) 8.6-50 mg per tablet 2 tablet, 2 tablet, oral, BID PRN, Sony Andre MD    sodium chloride 0.9% infusion, 100 mL/hr, intravenous, Continuous, Franklin Frazier MD       Review of Systems    10 organ ROS pertinent for history mentioned above, otherwise (-)ve        Physical Exam  HENT:      Head: Normocephalic.   Eyes:      Conjunctiva/sclera: Conjunctivae normal.   Cardiovascular:      Rate and Rhythm: Regular rhythm.   Pulmonary:      Breath sounds: Normal breath sounds.   Abdominal:      General: Bowel sounds are normal.      Palpations: Abdomen is soft.   Musculoskeletal:         General: Normal range of motion.   Skin:     General: Skin is warm and dry.   Neurological:      General: No focal deficit present.      Mental Status: He is alert.   Psychiatric:         Behavior: Behavior normal.                Last Recorded Vitals      Blood pressure 105/57, pulse 71, temperature 36.2 °C (97.2 °F), temperature source Temporal, resp. rate 20, height 1.828 m (5' 11.97\"), weight 135 kg (298 lb 11.6 oz), SpO2 97 %.    Relevant Results     Results for orders placed or performed during the hospital encounter of 09/29/23 " (from the past 24 hour(s))   Osmolality, Urine   Result Value Ref Range    Osmolality, Random Urine CANCELED    Lactate   Result Value Ref Range    Lactate 1.1 0.4 - 2.0 mmol/L   Magnesium   Result Value Ref Range    Magnesium 1.40 (L) 1.60 - 2.40 mg/dL   Thyroid Stimulating Hormone   Result Value Ref Range    Thyroid Stimulating Hormone 4.00 (H) 0.44 - 3.98 mIU/L   Renal Function Panel   Result Value Ref Range    Glucose 92 74 - 99 mg/dL    Sodium 134 (L) 136 - 145 mmol/L    Potassium 4.1 3.5 - 5.3 mmol/L    Chloride 103 98 - 107 mmol/L    Bicarbonate 24 21 - 32 mmol/L    Anion Gap 11 10 - 20 mmol/L    Urea Nitrogen 4 (L) 6 - 23 mg/dL    Creatinine 0.69 0.50 - 1.30 mg/dL    eGFR >90 >60 mL/min/1.73m*2    Calcium 9.2 8.6 - 10.3 mg/dL    Phosphorus 3.9 2.5 - 4.9 mg/dL    Albumin 3.2 (L) 3.4 - 5.0 g/dL   Basic metabolic panel   Result Value Ref Range    Glucose 87 74 - 99 mg/dL    Sodium 129 (L) 136 - 145 mmol/L    Potassium 4.6 3.5 - 5.3 mmol/L    Chloride 100 98 - 107 mmol/L    Bicarbonate 22 21 - 32 mmol/L    Anion Gap 12 mmol/L    Urea Nitrogen 4 (L) 6 - 23 mg/dL    Creatinine 0.70 0.50 - 1.30 mg/dL    eGFR >90 >60 mL/min/1.73m*2    Calcium 9.3 8.6 - 10.3 mg/dL   CBC   Result Value Ref Range    WBC 5.0 4.4 - 11.3 x10*3/uL    nRBC 0.0 0.0 - 0.0 /100 WBCs    RBC 4.56 4.50 - 5.90 x10*6/uL    Hemoglobin 13.7 13.5 - 17.5 g/dL    Hematocrit 42.0 41.0 - 52.0 %    MCV 92 80 - 100 fL    MCH 30.0 26.0 - 34.0 pg    MCHC 32.6 32.0 - 36.0 g/dL    RDW 12.5 11.5 - 14.5 %    Platelets 177 150 - 450 x10*3/uL    MPV 10.3 7.5 - 11.5 fL          Radiology  MRN: [id number]  Patient Name: WILLIE GOTTLIEB     STUDY:  CT HEAD WO CONTRAST; CT C-SPINE WO CONTRAST;  9/28/2023 9:29 pm     INDICATION:  Multiple falls on blood thinners     COMPARISON:  02/08/2020     ACCESSION NUMBER(S):  [id number]; [id number]     ORDERING CLINICIAN:  CHRISTINE ANSARI     TECHNIQUE:  Axial noncontrast CT images of head with coronal and sagittal  reconstructed  images.  Axial noncontrast CT images of the cervical spine with coronal and  sagittal reconstructed images.     FINDINGS:  CT HEAD:     BRAIN PARENCHYMA:  No evidence of acute intraparenchymal hemorrhage  or parenchymal evidence of acute large territory ischemic infarct. No  mass-effect, midline shift or effacement of cerebral sulci.  Gray-white matter distinction is preserved. Mild global volume loss  and chronic small vessel ischemic change     VENTRICLES and EXTRA-AXIAL SPACES:  No acute extra-axial or  intraventricular hemorrhage. Ventricles and sulci are age-concordant.     PARANASAL SINUSES/MASTOIDS:  Mild ethmoid sinus mucosal thickening.     CALVARIUM/ORBITS:  No skull fracture.  The orbits and globes are  intact to the extent visualized.     EXTRACRANIAL SOFT TISSUES: No discernible abnormality.        CT CERVICAL SPINE:     Mild multilevel degenerative disc disease is seen most notable at  C5-6 with mild spinal stenosis and mild bilateral foraminal stenosis.  Vascular calcification. Right IJ catheter appears to coil within the  internal jugular vein. Repositioning is advised     IMPRESSION:  No findings of acute intracranial abnormality.     No findings of acute cervical spine fracture. Straightening and mild  multilevel degenerative disc disease     Malpositioned right internal jugular venous catheter. Repositioning  advised       Assessment/Plan   Hyponatremia  S/p fall   Hypomagnesemia  Frequent falls  Nicotine dependence  Hx of Ca lung     PLAN: Pt was admitted on tele floor, monitor electrolytes, Nephrology and oncology consult, IVF, c/w other home meds, DVT prophylaxis, d/w CNP, follow closely, I ve coordinated the care.          Sony Andre MD

## 2023-10-01 NOTE — PROGRESS NOTES
Pt is a 62-year-old male with PMH of HTN, HLD, history of DVT and PE, on Eliquis, lung cancer s/p chemo and radiation presented to the hospital with multiple falls and patient was found to have severe hyponatremia, work up in process.        Subjective   Lying in bed no acute distress       Objective             Medications:       Current Facility-Administered Medications:     acetaminophen (Tylenol) tablet 650 mg, 650 mg, oral, q6h PRN, Sony Andre MD    albuterol 90 mcg/actuation inhaler 2 puff, 2 puff, inhalation, q6h PRN, Sony Andre MD    atorvastatin (Lipitor) tablet 40 mg, 40 mg, oral, Nightly, Sony Andre MD    budesonide (Pulmicort) 0.5 mg/2 mL nebulizer solution 0.5 mg, 0.5 mg, nebulization, BID, Sony Andre MD, 0.5 mg at 09/30/23 2040    budesonide-glycopyr-formoterol (BREZTRI) 160-9-4.8 mcg/actuation inhaler 2 puff, 2 puff, inhalation, BID, Sony Andre MD    fluticasone (Flonase) nasal spray 1 spray, 1 spray, Each Nostril, Daily, Sony Andre MD    furosemide (Lasix) tablet 20 mg, 20 mg, oral, Daily, Franklin Frazier MD, 20 mg at 10/01/23 1511    ipratropium-albuteroL (Duo-Neb) 0.5-2.5 mg/3 mL nebulizer solution 3 mL, 3 mL, nebulization, TID, Sony Andre MD, 3 mL at 10/01/23 1400    lidocaine 4 % patch 1 patch, 1 patch, transdermal, Daily, Sony Andre MD    loratadine (Claritin) tablet 10 mg, 10 mg, oral, Daily, Sony Andre MD, 10 mg at 10/01/23 0901    loratadine (Claritin) tablet 10 mg, 10 mg, oral, Daily, Sony Andre MD    metoprolol succinate XL (Toprol-XL) 24 hr tablet 25 mg, 25 mg, oral, Daily, Sony Andre MD    nicotine (Nicoderm CQ) 21 mg/24 hr patch 1 patch, 1 patch, transdermal, Daily, Sony Andre MD    omeprazole (PriLOSEC) DR capsule 20 mg, 20 mg, oral, Daily, Sony Andre MD    oxybutynin (Ditropan) tablet 5 mg, 5 mg, oral, BID, Sony Andre MD, 5 mg at 10/01/23 0900    oxyCODONE  (Roxicodone) immediate release tablet 10 mg, 10 mg, oral, q8h PRN, Sony Andre MD, 10 mg at 09/30/23 1920    oxyCODONE (Roxicodone) immediate release tablet 10 mg, 10 mg, oral, q8h PRN, Sony Andre MD    pantoprazole (ProtoNix) EC tablet 20 mg, 20 mg, oral, Daily before breakfast, Sony Andre MD, 20 mg at 10/01/23 0909    pregabalin (Lyrica) capsule 150 mg, 150 mg, oral, BID, Sony Andre MD    rivaroxaban (Xarelto) tablet 20 mg, 20 mg, oral, Daily with evening meal, Sony Andre MD    sennosides-docusate sodium (Brenda-Colace) 8.6-50 mg per tablet 2 tablet, 2 tablet, oral, Nightly PRN, Sony Andre MD    sildenafil (Viagra) tablet 100 mg, 100 mg, oral, Daily PRN, Sony Andre MD    tolterodine (Detrol) tablet 1 mg, 1 mg, oral, BID, Sony Andre MD       Physical Exam  HENT:      Head: Normocephalic.   Eyes:      Conjunctiva/sclera: Conjunctivae normal.   Cardiovascular:      Rate and Rhythm: Regular rhythm.   Pulmonary:      Breath sounds: Normal breath sounds.   Abdominal:      General: Bowel sounds are normal.      Palpations: Abdomen is soft.   Musculoskeletal:         General: Normal range of motion.   Skin:     General: Skin is warm and dry.   Neurological:      General: No focal deficit present.      Mental Status: He is alert.   Psychiatric:         Behavior: Behavior normal.         Results for orders placed or performed during the hospital encounter of 09/29/23 (from the past 24 hour(s))   Basic metabolic panel   Result Value Ref Range    Glucose 87 74 - 99 mg/dL    Sodium 129 (L) 136 - 145 mmol/L    Potassium 4.6 3.5 - 5.3 mmol/L    Chloride 100 98 - 107 mmol/L    Bicarbonate 22 21 - 32 mmol/L    Anion Gap 12 mmol/L    Urea Nitrogen 4 (L) 6 - 23 mg/dL    Creatinine 0.70 0.50 - 1.30 mg/dL    eGFR >90 >60 mL/min/1.73m*2    Calcium 9.3 8.6 - 10.3 mg/dL   Basic metabolic panel   Result Value Ref Range    Glucose 114 (H) 74 - 99 mg/dL    Sodium 130 (L)  136 - 145 mmol/L    Potassium 4.2 3.5 - 5.3 mmol/L    Chloride 101 98 - 107 mmol/L    Bicarbonate 20 (L) 21 - 32 mmol/L    Anion Gap 13 10 - 20 mmol/L    Urea Nitrogen 5 (L) 6 - 23 mg/dL    Creatinine 0.77 0.50 - 1.30 mg/dL    eGFR >90 >60 mL/min/1.73m*2    Calcium 9.0 8.6 - 10.3 mg/dL   CBC   Result Value Ref Range    WBC 5.0 4.4 - 11.3 x10*3/uL    nRBC 0.0 0.0 - 0.0 /100 WBCs    RBC 4.56 4.50 - 5.90 x10*6/uL    Hemoglobin 13.7 13.5 - 17.5 g/dL    Hematocrit 42.0 41.0 - 52.0 %    MCV 92 80 - 100 fL    MCH 30.0 26.0 - 34.0 pg    MCHC 32.6 32.0 - 36.0 g/dL    RDW 12.5 11.5 - 14.5 %    Platelets 177 150 - 450 x10*3/uL    MPV 10.3 7.5 - 11.5 fL   POCT GLUCOSE   Result Value Ref Range    POCT Glucose 96 74 - 99 mg/dL   Renal Function Panel   Result Value Ref Range    Glucose 98 74 - 99 mg/dL    Sodium 132 (L) 136 - 145 mmol/L    Potassium 4.1 3.5 - 5.3 mmol/L    Chloride 101 98 - 107 mmol/L    Bicarbonate 22 21 - 32 mmol/L    Anion Gap 13 10 - 20 mmol/L    Urea Nitrogen 5 (L) 6 - 23 mg/dL    Creatinine 0.69 0.50 - 1.30 mg/dL    eGFR >90 >60 mL/min/1.73m*2    Calcium 9.1 8.6 - 10.3 mg/dL    Phosphorus 3.5 2.5 - 4.9 mg/dL    Albumin 3.4 3.4 - 5.0 g/dL   POCT GLUCOSE   Result Value Ref Range    POCT Glucose 98 74 - 99 mg/dL   Folate   Result Value Ref Range    Folate CANCELED    Vitamin B12   Result Value Ref Range    Vitamin B-12 CANCELED    Thyroxine, Total   Result Value Ref Range    T4, Total CANCELED    Vitamin D 25-Hydroxy,Total (for eval of Vitamin D levels)   Result Value Ref Range    Vitamin D, 25-Hydroxy CANCELED    TSH   Result Value Ref Range    Thyroid Stimulating Hormone 4.50 (H) 0.44 - 3.98 mIU/L   Uric Acid   Result Value Ref Range    Uric Acid 4.5 4.0 - 7.5 mg/dL   Basic metabolic panel   Result Value Ref Range    Glucose 96 74 - 99 mg/dL    Sodium 131 (L) 136 - 145 mmol/L    Potassium 4.4 3.5 - 5.3 mmol/L    Chloride 102 98 - 107 mmol/L    Bicarbonate 25 21 - 32 mmol/L    Anion Gap 8 (L) 10 - 20 mmol/L     "Urea Nitrogen 6 6 - 23 mg/dL    Creatinine 0.74 0.50 - 1.30 mg/dL    eGFR >90 >60 mL/min/1.73m*2    Calcium 9.2 8.6 - 10.3 mg/dL          Last Recorded Vitals      Blood pressure 128/65, pulse 82, temperature 36.6 °C (97.9 °F), temperature source Temporal, resp. rate 18, height 1.828 m (5' 11.97\"), weight 135 kg (298 lb 11.6 oz), SpO2 94 %.        Intake/Output last 3 Shifts:  I/O last 3 completed shifts:  In: 417 (3.1 mL/kg) [P.O.:120; I.V.:297 (2.2 mL/kg)]  Out: 2601 (19.2 mL/kg) [Urine:2600 (0.5 mL/kg/hr); Stool:1]  Weight: 135.5 kg                      === 09/29/23 ===    XR CHEST 1 VIEW    - Impression -  Suspect bronchitis versus mild edema. Small effusions not excluded.        Signed by Dima Nieves II, MD      Assessment/Plan        Hyponatremia secondary to hypovolemia   Hypomagnesemia.  Anemia.History of lung cancer status post chemo and radiation.  History of DVT and PE.  HTN  HLD            PLAN:  Pt's Na+ is slowly improving, med list and labs reviewed, for now c/w current Rx, follow closely.      Sony Andre MD    "

## 2023-10-02 LAB
ANION GAP SERPL CALC-SCNC: 10 MMOL/L (ref 10–20)
ANION GAP SERPL CALC-SCNC: 12 MMOL/L (ref 10–20)
APPEARANCE UR: CLEAR
BILIRUB UR STRIP.AUTO-MCNC: NEGATIVE MG/DL
BUN SERPL-MCNC: 7 MG/DL (ref 6–23)
BUN SERPL-MCNC: 9 MG/DL (ref 6–23)
CALCIUM SERPL-MCNC: 9.1 MG/DL (ref 8.6–10.3)
CALCIUM SERPL-MCNC: 9.3 MG/DL (ref 8.6–10.3)
CHLORIDE SERPL-SCNC: 101 MMOL/L (ref 98–107)
CHLORIDE SERPL-SCNC: 99 MMOL/L (ref 98–107)
CO2 SERPL-SCNC: 26 MMOL/L (ref 21–32)
CO2 SERPL-SCNC: 26 MMOL/L (ref 21–32)
COLOR UR: YELLOW
CREAT SERPL-MCNC: 0.67 MG/DL (ref 0.5–1.3)
CREAT SERPL-MCNC: 0.71 MG/DL (ref 0.5–1.3)
CREAT UR-MCNC: 47.3 MG/DL (ref 20–370)
CREAT UR-MCNC: 47.3 MG/DL (ref 20–370)
ERYTHROCYTE [DISTWIDTH] IN BLOOD BY AUTOMATED COUNT: 12.2 % (ref 11.5–14.5)
GFR SERPL CREATININE-BSD FRML MDRD: >90 ML/MIN/1.73M*2
GFR SERPL CREATININE-BSD FRML MDRD: >90 ML/MIN/1.73M*2
GLUCOSE SERPL-MCNC: 108 MG/DL (ref 74–99)
GLUCOSE SERPL-MCNC: 77 MG/DL (ref 74–99)
GLUCOSE UR STRIP.AUTO-MCNC: NEGATIVE MG/DL
HCT VFR BLD AUTO: 39.1 % (ref 41–52)
HGB BLD-MCNC: 12.9 G/DL (ref 13.5–17.5)
KETONES UR STRIP.AUTO-MCNC: NEGATIVE MG/DL
LEUKOCYTE ESTERASE UR QL STRIP.AUTO: NEGATIVE
MAGNESIUM SERPL-MCNC: 1.43 MG/DL (ref 1.6–2.4)
MCH RBC QN AUTO: 29.7 PG (ref 26–34)
MCHC RBC AUTO-ENTMCNC: 33 G/DL (ref 32–36)
MCV RBC AUTO: 90 FL (ref 80–100)
NITRITE UR QL STRIP.AUTO: NEGATIVE
NRBC BLD-RTO: 0 /100 WBCS (ref 0–0)
OSMOLALITY SERPL: 278 MOSM/KG (ref 280–300)
PH UR STRIP.AUTO: 7 [PH]
PLATELET # BLD AUTO: 205 X10*3/UL (ref 150–450)
PMV BLD AUTO: 10.5 FL (ref 7.5–11.5)
POTASSIUM SERPL-SCNC: 3.8 MMOL/L (ref 3.5–5.3)
POTASSIUM SERPL-SCNC: 4 MMOL/L (ref 3.5–5.3)
PROT UR STRIP.AUTO-MCNC: NEGATIVE MG/DL
RBC # BLD AUTO: 4.34 X10*6/UL (ref 4.5–5.9)
RBC # UR STRIP.AUTO: ABNORMAL /UL
RBC #/AREA URNS AUTO: NORMAL /HPF
SODIUM SERPL-SCNC: 133 MMOL/L (ref 136–145)
SODIUM SERPL-SCNC: 133 MMOL/L (ref 136–145)
SODIUM UR-SCNC: 113 MMOL/L
SODIUM/CREAT UR-RTO: 239 MMOL/G CREAT
SP GR UR STRIP.AUTO: 1.01
UREA/CREAT UR-SRTO: 4.1 G/G CREAT
UROBILINOGEN UR STRIP.AUTO-MCNC: <2 MG/DL
UUN UR-MCNC: 192 MG/DL
WBC # BLD AUTO: 5.8 X10*3/UL (ref 4.4–11.3)
WBC #/AREA URNS AUTO: NORMAL /HPF

## 2023-10-02 PROCEDURE — 2500000002 HC RX 250 W HCPCS SELF ADMINISTERED DRUGS (ALT 637 FOR MEDICARE OP, ALT 636 FOR OP/ED): Performed by: INTERNAL MEDICINE

## 2023-10-02 PROCEDURE — 2500000004 HC RX 250 GENERAL PHARMACY W/ HCPCS (ALT 636 FOR OP/ED): Performed by: INTERNAL MEDICINE

## 2023-10-02 PROCEDURE — 81001 URINALYSIS AUTO W/SCOPE: CPT | Performed by: INTERNAL MEDICINE

## 2023-10-02 PROCEDURE — S4991 NICOTINE PATCH NONLEGEND: HCPCS | Performed by: INTERNAL MEDICINE

## 2023-10-02 PROCEDURE — 1200000002 HC GENERAL ROOM WITH TELEMETRY DAILY

## 2023-10-02 PROCEDURE — 84540 ASSAY OF URINE/UREA-N: CPT | Performed by: INTERNAL MEDICINE

## 2023-10-02 PROCEDURE — 2500000001 HC RX 250 WO HCPCS SELF ADMINISTERED DRUGS (ALT 637 FOR MEDICARE OP): Performed by: INTERNAL MEDICINE

## 2023-10-02 PROCEDURE — 84132 ASSAY OF SERUM POTASSIUM: CPT | Performed by: NURSE PRACTITIONER

## 2023-10-02 PROCEDURE — 83735 ASSAY OF MAGNESIUM: CPT | Performed by: PHYSICIAN ASSISTANT

## 2023-10-02 PROCEDURE — 36415 COLL VENOUS BLD VENIPUNCTURE: CPT | Performed by: NURSE PRACTITIONER

## 2023-10-02 PROCEDURE — 94640 AIRWAY INHALATION TREATMENT: CPT

## 2023-10-02 PROCEDURE — 85027 COMPLETE CBC AUTOMATED: CPT | Performed by: NURSE PRACTITIONER

## 2023-10-02 PROCEDURE — 3490 HC RX 250 GENERAL PHARMACY W/ HCPCS (ALT 636 FOR OP/ED): Performed by: INTERNAL MEDICINE

## 2023-10-02 PROCEDURE — 80048 BASIC METABOLIC PNL TOTAL CA: CPT | Performed by: INTERNAL MEDICINE

## 2023-10-02 PROCEDURE — 2500000005 HC RX 250 GENERAL PHARMACY W/O HCPCS: Performed by: INTERNAL MEDICINE

## 2023-10-02 PROCEDURE — 84300 ASSAY OF URINE SODIUM: CPT | Performed by: INTERNAL MEDICINE

## 2023-10-02 RX ORDER — MAGNESIUM SULFATE 1 G/100ML
1 INJECTION INTRAVENOUS ONCE
Status: COMPLETED | OUTPATIENT
Start: 2023-10-02 | End: 2023-10-02

## 2023-10-02 RX ORDER — MAGNESIUM SULFATE HEPTAHYDRATE 40 MG/ML
2 INJECTION, SOLUTION INTRAVENOUS ONCE
Status: COMPLETED | OUTPATIENT
Start: 2023-10-02 | End: 2023-10-02

## 2023-10-02 RX ADMIN — OXYBUTYNIN CHLORIDE 5 MG: 5 TABLET ORAL at 08:49

## 2023-10-02 RX ADMIN — OXYCODONE HYDROCHLORIDE 10 MG: 10 TABLET ORAL at 15:39

## 2023-10-02 RX ADMIN — METOPROLOL SUCCINATE 25 MG: 25 TABLET, EXTENDED RELEASE ORAL at 08:49

## 2023-10-02 RX ADMIN — MAGNESIUM SULFATE HEPTAHYDRATE 1 G: 1 INJECTION, SOLUTION INTRAVENOUS at 15:41

## 2023-10-02 RX ADMIN — FLUTICASONE PROPIONATE 1 SPRAY: 50 SPRAY, METERED NASAL at 08:48

## 2023-10-02 RX ADMIN — OXYBUTYNIN CHLORIDE 5 MG: 5 TABLET ORAL at 20:24

## 2023-10-02 RX ADMIN — IPRATROPIUM BROMIDE AND ALBUTEROL SULFATE 3 ML: 2.5; .5 SOLUTION RESPIRATORY (INHALATION) at 14:53

## 2023-10-02 RX ADMIN — ATORVASTATIN CALCIUM 40 MG: 40 TABLET, FILM COATED ORAL at 20:24

## 2023-10-02 RX ADMIN — PREGABALIN 150 MG: 150 CAPSULE ORAL at 20:24

## 2023-10-02 RX ADMIN — FUROSEMIDE 20 MG: 20 TABLET ORAL at 08:49

## 2023-10-02 RX ADMIN — LORATADINE 10 MG: 10 TABLET ORAL at 08:49

## 2023-10-02 RX ADMIN — NICOTINE 1 PATCH: 21 PATCH, EXTENDED RELEASE TRANSDERMAL at 08:48

## 2023-10-02 RX ADMIN — MAGNESIUM SULFATE HEPTAHYDRATE 2 G: 40 INJECTION, SOLUTION INTRAVENOUS at 13:28

## 2023-10-02 RX ADMIN — BUDESONIDE 0.5 MG: 0.5 INHALANT RESPIRATORY (INHALATION) at 21:13

## 2023-10-02 RX ADMIN — PANTOPRAZOLE SODIUM 40 MG: 40 TABLET, DELAYED RELEASE ORAL at 08:47

## 2023-10-02 RX ADMIN — OXYCODONE HYDROCHLORIDE 10 MG: 10 TABLET ORAL at 10:29

## 2023-10-02 RX ADMIN — LIDOCAINE 1 PATCH: 4 PATCH TOPICAL at 08:48

## 2023-10-02 RX ADMIN — IPRATROPIUM BROMIDE AND ALBUTEROL SULFATE 3 ML: 2.5; .5 SOLUTION RESPIRATORY (INHALATION) at 21:19

## 2023-10-02 RX ADMIN — PREGABALIN 150 MG: 150 CAPSULE ORAL at 08:49

## 2023-10-02 RX ADMIN — RIVAROXABAN 20 MG: 20 TABLET, FILM COATED ORAL at 17:11

## 2023-10-02 RX ADMIN — OXYCODONE HYDROCHLORIDE 10 MG: 10 TABLET ORAL at 23:36

## 2023-10-02 SDOH — SOCIAL STABILITY: SOCIAL INSECURITY: ARE YOU OR HAVE YOU BEEN THREATENED OR ABUSED PHYSICALLY, EMOTIONALLY, OR SEXUALLY BY ANYONE?: NO

## 2023-10-02 SDOH — SOCIAL STABILITY: SOCIAL INSECURITY: HAS ANYONE EVER THREATENED TO HURT YOUR FAMILY OR YOUR PETS?: NO

## 2023-10-02 SDOH — SOCIAL STABILITY: SOCIAL INSECURITY: DOES ANYONE TRY TO KEEP YOU FROM HAVING/CONTACTING OTHER FRIENDS OR DOING THINGS OUTSIDE YOUR HOME?: NO

## 2023-10-02 SDOH — SOCIAL STABILITY: SOCIAL INSECURITY: DO YOU FEEL ANYONE HAS EXPLOITED OR TAKEN ADVANTAGE OF YOU FINANCIALLY OR OF YOUR PERSONAL PROPERTY?: NO

## 2023-10-02 SDOH — SOCIAL STABILITY: SOCIAL INSECURITY: DO YOU FEEL UNSAFE GOING BACK TO THE PLACE WHERE YOU ARE LIVING?: NO

## 2023-10-02 SDOH — SOCIAL STABILITY: SOCIAL INSECURITY: ABUSE: ADULT

## 2023-10-02 SDOH — SOCIAL STABILITY: SOCIAL INSECURITY: ARE THERE ANY APPARENT SIGNS OF INJURIES/BEHAVIORS THAT COULD BE RELATED TO ABUSE/NEGLECT?: NO

## 2023-10-02 ASSESSMENT — ACTIVITIES OF DAILY LIVING (ADL)
BATHING: INDEPENDENT
PATIENT'S MEMORY ADEQUATE TO SAFELY COMPLETE DAILY ACTIVITIES?: YES
JUDGMENT_ADEQUATE_SAFELY_COMPLETE_DAILY_ACTIVITIES: NO
HEARING - RIGHT EAR: FUNCTIONAL
WALKS IN HOME: INDEPENDENT
JUDGMENT_ADEQUATE_SAFELY_COMPLETE_DAILY_ACTIVITIES: YES
DRESSING YOURSELF: NEEDS ASSISTANCE
ADEQUATE_TO_COMPLETE_ADL: YES
BATHING: INDEPENDENT
DRESSING YOURSELF: NEEDS ASSISTANCE
HEARING - LEFT EAR: FUNCTIONAL
TOILETING: NEEDS ASSISTANCE
ASSISTIVE_DEVICE: WALKER
PATIENT'S MEMORY ADEQUATE TO SAFELY COMPLETE DAILY ACTIVITIES?: YES
GROOMING: INDEPENDENT
HEARING - RIGHT EAR: FUNCTIONAL
GROOMING: NEEDS ASSISTANCE
ADEQUATE_TO_COMPLETE_ADL: YES
WALKS IN HOME: NEEDS ASSISTANCE
TOILETING: NEEDS ASSISTANCE
FEEDING YOURSELF: INDEPENDENT
HEARING - LEFT EAR: FUNCTIONAL
FEEDING YOURSELF: INDEPENDENT
ASSISTIVE_DEVICE: WALKER

## 2023-10-02 ASSESSMENT — PAIN SCALES - PAIN ASSESSMENT IN ADVANCED DEMENTIA (PAINAD)
FACIALEXPRESSION: FACIAL GRIMACING
TOTALSCORE: 6
NEGVOCALIZATION: REPEATED TROUBLED CALLING OUT, LOUD MOANING/GROANING, CRYING
BREATHING: NORMAL
CONSOLABILITY: DISTRACTED OR REASSURED BY VOICE/TOUCH
BODYLANGUAGE: TENSE, DISTRESSED PACING, FIDGETING

## 2023-10-02 ASSESSMENT — PAIN SCALES - GENERAL
PAINLEVEL_OUTOF10: 3
PAINLEVEL_OUTOF10: 8
PAINLEVEL_OUTOF10: 7
PAINLEVEL_OUTOF10: 5 - MODERATE PAIN

## 2023-10-02 ASSESSMENT — PAIN - FUNCTIONAL ASSESSMENT: PAIN_FUNCTIONAL_ASSESSMENT: 0-10

## 2023-10-02 ASSESSMENT — COGNITIVE AND FUNCTIONAL STATUS - GENERAL
MOVING FROM LYING ON BACK TO SITTING ON SIDE OF FLAT BED WITH BEDRAILS: A LITTLE
PATIENT BASELINE BEDBOUND: NO
MOVING TO AND FROM BED TO CHAIR: A LITTLE
MOBILITY SCORE: 15
WALKING IN HOSPITAL ROOM: A LITTLE
CLIMB 3 TO 5 STEPS WITH RAILING: A LOT
STANDING UP FROM CHAIR USING ARMS: A LOT
TURNING FROM BACK TO SIDE WHILE IN FLAT BAD: A LOT

## 2023-10-02 ASSESSMENT — LIFESTYLE VARIABLES
HOW OFTEN DO YOU HAVE A DRINK CONTAINING ALCOHOL: 2-4 TIMES A MONTH
HOW MANY STANDARD DRINKS CONTAINING ALCOHOL DO YOU HAVE ON A TYPICAL DAY: 1 OR 2

## 2023-10-02 ASSESSMENT — PAIN DESCRIPTION - DESCRIPTORS: DESCRIPTORS: ACHING

## 2023-10-02 ASSESSMENT — PAIN SCALES - WONG BAKER: WONGBAKER_NUMERICALRESPONSE: HURTS WHOLE LOT

## 2023-10-02 NOTE — PROGRESS NOTES
I have not discussed back pain with this patient.  I will not be authorizing a back brace for her.   Pt is a 62-year-old male with PMH of HTN, HLD, history of DVT and PE, on Eliquis, lung cancer s/p chemo and radiation presented to the hospital with multiple falls and patient was found to have severe hyponatremia, work up in process.              Subjective   Lying in bed no acute distress           Objective         Current Facility-Administered Medications:     acetaminophen (Tylenol) tablet 650 mg, 650 mg, oral, q6h PRN, Sony nAdre MD    albuterol 2.5 mg /3 mL (0.083 %) nebulizer solution 2.5 mg, 2.5 mg, nebulization, q6h PRN, Sony Andre MD    atorvastatin (Lipitor) tablet 40 mg, 40 mg, oral, Nightly, Sony Andre MD, 40 mg at 10/01/23 2013    budesonide (Pulmicort) 0.5 mg/2 mL nebulizer solution 0.5 mg, 0.5 mg, nebulization, BID, Sony Andre MD, 0.5 mg at 09/30/23 2040    fluticasone (Flonase) nasal spray 1 spray, 1 spray, Each Nostril, Daily, Sony Andre MD, 1 spray at 10/02/23 0848    furosemide (Lasix) tablet 20 mg, 20 mg, oral, Daily, Franklin Frazier MD, 20 mg at 10/02/23 0849    ipratropium-albuteroL (Duo-Neb) 0.5-2.5 mg/3 mL nebulizer solution 3 mL, 3 mL, nebulization, TID, Sony Andre MD, 3 mL at 10/02/23 1453    lidocaine 4 % patch 1 patch, 1 patch, transdermal, Daily, Sony Andre MD, 1 patch at 10/02/23 0848    loratadine (Claritin) tablet 10 mg, 10 mg, oral, Daily, Sony Andre MD, 10 mg at 10/02/23 0849    metoprolol succinate XL (Toprol-XL) 24 hr tablet 25 mg, 25 mg, oral, Daily, Sony Andre MD, 25 mg at 10/02/23 0849    nicotine (Nicoderm CQ) 21 mg/24 hr patch 1 patch, 1 patch, transdermal, Daily, Sony Andre MD, 1 patch at 10/02/23 0848    oxybutynin (Ditropan) tablet 5 mg, 5 mg, oral, BID, Sony Andre MD, 5 mg at 10/02/23 0849    oxyCODONE (Roxicodone) immediate release tablet 10 mg, 10 mg, oral, q8h PRN, Sony Andre MD, 10 mg at 10/02/23 1539    oxyCODONE (Roxicodone) immediate release tablet 10  "mg, 10 mg, oral, q8h PRN, Sony Andre MD    pantoprazole (ProtoNix) EC tablet 40 mg, 40 mg, oral, Daily before breakfast, Sony Andre MD, 40 mg at 10/02/23 0847    pregabalin (Lyrica) capsule 150 mg, 150 mg, oral, BID, Sony Andre MD, 150 mg at 10/02/23 0849    rivaroxaban (Xarelto) tablet 20 mg, 20 mg, oral, Daily with evening meal, Sony Andre MD, 20 mg at 10/02/23 1711    sennosides-docusate sodium (Rbenda-Colace) 8.6-50 mg per tablet 2 tablet, 2 tablet, oral, Nightly PRN, Sony Andre MD       Physical Exam  HENT:      Head: Normocephalic.   Eyes:      Conjunctiva/sclera: Conjunctivae normal.   Cardiovascular:      Rate and Rhythm: Regular rhythm.   Pulmonary:      Breath sounds: Normal breath sounds.   Abdominal:      General: Bowel sounds are normal.      Palpations: Abdomen is soft.   Musculoskeletal:         General: Normal range of motion.   Skin:     General: Skin is warm and dry.   Neurological:      General: No focal deficit present.      Mental Status: He is alert.   Psychiatric:         Behavior: Behavior normal.               Last Recorded Vitals  Blood pressure 133/75, pulse 86, temperature 36.4 °C (97.5 °F), resp. rate 17, height 1.828 m (5' 11.97\"), weight 135 kg (298 lb 11.6 oz), SpO2 96 %.  Intake/Output last 3 Shifts:  I/O last 3 completed shifts:  In: 810 (6 mL/kg) [P.O.:360; I.V.:450 (3.3 mL/kg)]  Out: 2650 (19.6 mL/kg) [Urine:2650 (0.5 mL/kg/hr)]  Weight: 135.5 kg     Labs:       Results for orders placed or performed during the hospital encounter of 09/29/23 (from the past 24 hour(s))   CBC   Result Value Ref Range    WBC 5.8 4.4 - 11.3 x10*3/uL    nRBC 0.0 0.0 - 0.0 /100 WBCs    RBC 4.34 (L) 4.50 - 5.90 x10*6/uL    Hemoglobin 12.9 (L) 13.5 - 17.5 g/dL    Hematocrit 39.1 (L) 41.0 - 52.0 %    MCV 90 80 - 100 fL    MCH 29.7 26.0 - 34.0 pg    MCHC 33.0 32.0 - 36.0 g/dL    RDW 12.2 11.5 - 14.5 %    Platelets 205 150 - 450 x10*3/uL    MPV 10.5 7.5 - 11.5 fL "   Magnesium   Result Value Ref Range    Magnesium 1.43 (L) 1.60 - 2.40 mg/dL   Basic metabolic panel   Result Value Ref Range    Glucose 77 74 - 99 mg/dL    Sodium 133 (L) 136 - 145 mmol/L    Potassium 4.0 3.5 - 5.3 mmol/L    Chloride 101 98 - 107 mmol/L    Bicarbonate 26 21 - 32 mmol/L    Anion Gap 10 10 - 20 mmol/L    Urea Nitrogen 7 6 - 23 mg/dL    Creatinine 0.67 0.50 - 1.30 mg/dL    eGFR >90 >60 mL/min/1.73m*2    Calcium 9.1 8.6 - 10.3 mg/dL   Urinalysis with Reflex Microscopic   Result Value Ref Range    Color, Urine Yellow Straw, Yellow    Appearance, Urine Clear Clear    Specific Gravity, Urine 1.008 1.005 - 1.035    pH, Urine 7.0 5.0, 5.5, 6.0, 6.5, 7.0, 7.5, 8.0    Protein, Urine NEGATIVE NEGATIVE mg/dL    Glucose, Urine NEGATIVE NEGATIVE mg/dL    Blood, Urine SMALL (1+) (A) NEGATIVE    Ketones, Urine NEGATIVE NEGATIVE mg/dL    Bilirubin, Urine NEGATIVE NEGATIVE    Urobilinogen, Urine <2.0 <2.0 mg/dL    Nitrite, Urine NEGATIVE NEGATIVE    Leukocyte Esterase, Urine NEGATIVE NEGATIVE   Sodium, Urine Random   Result Value Ref Range    Sodium, Urine Random 113 mmol/L    Creatinine, Urine Random 47.3 20.0 - 370.0 mg/dL    Sodium/Creatinine Ratio 239 Not established. mmol/g Creat   Urea Nitrogen, Urine Random   Result Value Ref Range    Urea Nitrogen, Urine Random 192 mg/dL    Creatinine, Urine Random 47.3 20.0 - 370.0 mg/dL    Urea Nitrogen/Creatinine Ratio 4.1 Not established. g/g creat   Urinalysis Microscopic Only   Result Value Ref Range    WBC, Urine NONE 1-5, NONE /HPF    RBC, Urine NONE NONE, 1-2, 3-5 /HPF   Basic metabolic panel   Result Value Ref Range    Glucose 108 (H) 74 - 99 mg/dL    Sodium 133 (L) 136 - 145 mmol/L    Potassium 3.8 3.5 - 5.3 mmol/L    Chloride 99 98 - 107 mmol/L    Bicarbonate 26 21 - 32 mmol/L    Anion Gap 12 10 - 20 mmol/L    Urea Nitrogen 9 6 - 23 mg/dL    Creatinine 0.71 0.50 - 1.30 mg/dL    eGFR >90 >60 mL/min/1.73m*2    Calcium 9.3 8.6 - 10.3 mg/dL               Assessment/Plan     Hyponatremia secondary to hypovolemia   Hypomagnesemia.  Anemia.History of lung cancer status post chemo and radiation.  History of DVT and PE.  HTN  HLD           PLAN:  Pt's Na+ is little better, med list and labs reviewed, for now c/w current Rx, follow closely.         Sony Andre MD

## 2023-10-02 NOTE — CARE PLAN
The patient's goals for the shift include      The clinical goals for the shift include not get up on his own    Use call light effectively    Experience less/manageable pain      Over the shift, the patient made progress toward the following goals.

## 2023-10-02 NOTE — PROGRESS NOTES
INPATIENT NEPHROLOGY CONSULT PROGRESS NOTES    Patient Name: Jarocho Luis   MRN: [id number]  CONSULTING SERVICE: Nephrology    Impression :   This is a 62-year-old male with past medical history of hypertension, hyperlipidemia, history of DVT and PE, on Eliquis, lung cancer status post chemo and radiation presented to the hospital with multiple falls and patient was found to have severe hyponatremia and nephrology consulted for further evaluation and management.     1.  Hyponatremia secondary to hypovolemia and poor solute intake, and has some component of SIADH.    2.  Hypomagnesemia.  3.  Anemia.  4.  History of lung cancer status post chemo and radiation.  5.  History of DVT and PE.  6.  Hyperlipidemia.  7.  Hypertension.    Plan.  --Serum sodium has been improving, up to 133 and patient is currently off of IV hydration and Lasix 20 mg p.o. daily.  -Volume status has been stable.  -Remains nonoliguric.  -Repeat urine sodium is 113.  Patient likely has some component of SIADH and continue with Lasix for now.  -Hypertension: Blood pressure has been optimally controlled, continue metoprolol 25 mg p.o. daily.    Thank you for the consultation and will follow with you closely.    ================================================================  INTERVAL HPI: The patient was seen and examined. No acute event overnight.  Denies any complaints, chest pain, shortness of breath, nausea, vomiting, abdominal pain, headache, dizziness.  PERTINENT ROS:   GENERAL: No fever/chills.  RESPIRATORY: Negative for cough, wheezing or shortness of breath.  CARDIOVASCULAR: Negative for chest pain or palpitations.  GI: Negative for nausea, vomiting,  Diarrhea, abdominal pain.    : Negative for dysuria and hematuria    MEDICATIONS:  Current active Inpatient Medication reviewed.   Current Facility-Administered Medications   Medication Dose Route Frequency Provider Last Rate Last Admin    acetaminophen (Tylenol) tablet 650 mg  650 mg oral  q6h PRN Sony Andre MD        albuterol 2.5 mg /3 mL (0.083 %) nebulizer solution 2.5 mg  2.5 mg nebulization q6h PRN Sony Andre MD        atorvastatin (Lipitor) tablet 40 mg  40 mg oral Nightly Sony Andre MD   40 mg at 10/01/23 2013    budesonide (Pulmicort) 0.5 mg/2 mL nebulizer solution 0.5 mg  0.5 mg nebulization BID Sony Andre MD   0.5 mg at 09/30/23 2040    fluticasone (Flonase) nasal spray 1 spray  1 spray Each Nostril Daily Sony Andre MD   1 spray at 10/02/23 0848    furosemide (Lasix) tablet 20 mg  20 mg oral Daily Franklin Frazier MD   20 mg at 10/02/23 0849    ipratropium-albuteroL (Duo-Neb) 0.5-2.5 mg/3 mL nebulizer solution 3 mL  3 mL nebulization TID Sony Andre MD   3 mL at 10/01/23 1400    lidocaine 4 % patch 1 patch  1 patch transdermal Daily Sony Andre MD   1 patch at 10/02/23 0848    loratadine (Claritin) tablet 10 mg  10 mg oral Daily Sony Andre MD   10 mg at 10/02/23 0849    magnesium sulfate in D5W IV 1 g  1 g intravenous Once Franklin Frazier MD        magnesium sulfate IV 2 g  2 g intravenous Once Franklin Frazier MD 12.5 mL/hr at 10/02/23 1328 2 g at 10/02/23 1328    metoprolol succinate XL (Toprol-XL) 24 hr tablet 25 mg  25 mg oral Daily Sony Andre MD   25 mg at 10/02/23 0849    nicotine (Nicoderm CQ) 21 mg/24 hr patch 1 patch  1 patch transdermal Daily Sony Andre MD   1 patch at 10/02/23 0848    oxybutynin (Ditropan) tablet 5 mg  5 mg oral BID Sony Andre MD   5 mg at 10/02/23 0849    oxyCODONE (Roxicodone) immediate release tablet 10 mg  10 mg oral q8h PRN Sony Andre MD   10 mg at 10/02/23 1029    oxyCODONE (Roxicodone) immediate release tablet 10 mg  10 mg oral q8h PRN Sony Andre MD        pantoprazole (ProtoNix) EC tablet 40 mg  40 mg oral Daily before breakfast Sony Andre MD   40 mg at 10/02/23 0847    pregabalin (Lyrica) capsule 150 mg  150 mg oral BID  "Sony Andre MD   150 mg at 10/02/23 0849    rivaroxaban (Xarelto) tablet 20 mg  20 mg oral Daily with evening meal Sony Andre MD   20 mg at 10/01/23 1825    sennosides-docusate sodium (Brenda-Colace) 8.6-50 mg per tablet 2 tablet  2 tablet oral Nightly PRN Sony Andre MD           PHYSICAL EXAM:   /68   Pulse 84   Temp 36.9 °C (98.4 °F)   Resp 20   Ht 1.828 m (5' 11.97\")   Wt 135 kg (298 lb 11.6 oz)   SpO2 94%   BMI 40.55 kg/m²   Patient Vitals for the past 24 hrs:   BP   10/02/23 0800 144/68   10/01/23 2000 142/76   10/01/23 1528 128/65       Intake/Output Summary (Last 24 hours) at 10/2/2023 1439  Last data filed at 10/2/2023 0500  Gross per 24 hour   Intake 450 ml   Output 1850 ml   Net -1400 ml     GENERAL: Alert, no distress, cooperative  SKIN: Skin color, texture, turgor normal. No rashes or lesions.  HEAD/SINUSES: No significant findings  EYES: PE  OROPHARYNX: oral mucosa moist. Oropharynx normal.  NECK: No jugulovenous distention, No carotid bruits, Supple  LUNGS: Lungs clear to auscultation, no wheeze, rhonchi, or rales  CARDIAC: Normal S1 and S2; no rubs, murmurs, or gallops  ABDOMEN: Abdomen soft, non-tender, BS normal, No masses. No abdominal bruits.  EXTREMITIES: Normal exam of the extremities.   NEURO: No focal deficits. Able to move all 4 extremities.  PULSES: 2+ radial, 2 + femoral    DATA:   Diagnostic tests reviewed for today's visit:        Urobilinogen, Urine   Date Value Ref Range Status   10/02/2023 <2.0 <2.0 mg/dL Final       No components found for: \"PROTTIMED\", \"UALBCR\", \"UPROT\", \"CREAT\"    No results found for: \"CHOL\", \"HDL\", \"LDL\"  IMAGING:  reviewed in  images    SIGNATURE: Franklin Frazier MD    "

## 2023-10-03 PROBLEM — E22.2 SIADH (SYNDROME OF INAPPROPRIATE ADH PRODUCTION) (MULTI): Status: ACTIVE | Noted: 2023-10-03

## 2023-10-03 LAB
ALBUMIN SERPL BCP-MCNC: 3.2 G/DL (ref 3.4–5)
ANION GAP SERPL CALC-SCNC: 12 MMOL/L (ref 10–20)
ANION GAP SERPL CALC-SCNC: 9 MMOL/L (ref 10–20)
BUN SERPL-MCNC: 10 MG/DL (ref 6–23)
BUN SERPL-MCNC: 8 MG/DL (ref 6–23)
CALCIUM SERPL-MCNC: 8.8 MG/DL (ref 8.6–10.3)
CALCIUM SERPL-MCNC: 9.3 MG/DL (ref 8.6–10.3)
CHLORIDE SERPL-SCNC: 101 MMOL/L (ref 98–107)
CHLORIDE SERPL-SCNC: 97 MMOL/L (ref 98–107)
CO2 SERPL-SCNC: 23 MMOL/L (ref 21–32)
CO2 SERPL-SCNC: 30 MMOL/L (ref 21–32)
CREAT SERPL-MCNC: 0.65 MG/DL (ref 0.5–1.3)
CREAT SERPL-MCNC: 0.83 MG/DL (ref 0.5–1.3)
ERYTHROCYTE [DISTWIDTH] IN BLOOD BY AUTOMATED COUNT: 12.3 % (ref 11.5–14.5)
GFR SERPL CREATININE-BSD FRML MDRD: >90 ML/MIN/1.73M*2
GFR SERPL CREATININE-BSD FRML MDRD: >90 ML/MIN/1.73M*2
GLUCOSE SERPL-MCNC: 83 MG/DL (ref 74–99)
GLUCOSE SERPL-MCNC: 94 MG/DL (ref 74–99)
HCT VFR BLD AUTO: 40.3 % (ref 41–52)
HGB BLD-MCNC: 12.9 G/DL (ref 13.5–17.5)
MAGNESIUM SERPL-MCNC: 1.66 MG/DL (ref 1.6–2.4)
MCH RBC QN AUTO: 30.1 PG (ref 26–34)
MCHC RBC AUTO-ENTMCNC: 32 G/DL (ref 32–36)
MCV RBC AUTO: 94 FL (ref 80–100)
NRBC BLD-RTO: 0 /100 WBCS (ref 0–0)
OSMOLALITY UR: 342 MOSM/KG (ref 200–1200)
PHOSPHATE SERPL-MCNC: 4.7 MG/DL (ref 2.5–4.9)
PLATELET # BLD AUTO: 201 X10*3/UL (ref 150–450)
PMV BLD AUTO: 10.2 FL (ref 7.5–11.5)
POTASSIUM SERPL-SCNC: 3.8 MMOL/L (ref 3.5–5.3)
POTASSIUM SERPL-SCNC: 4.1 MMOL/L (ref 3.5–5.3)
RBC # BLD AUTO: 4.28 X10*6/UL (ref 4.5–5.9)
SODIUM SERPL-SCNC: 132 MMOL/L (ref 136–145)
SODIUM SERPL-SCNC: 132 MMOL/L (ref 136–145)
WBC # BLD AUTO: 5.5 X10*3/UL (ref 4.4–11.3)

## 2023-10-03 PROCEDURE — 97116 GAIT TRAINING THERAPY: CPT | Mod: GP

## 2023-10-03 PROCEDURE — 2500000004 HC RX 250 GENERAL PHARMACY W/ HCPCS (ALT 636 FOR OP/ED): Performed by: PHYSICIAN ASSISTANT

## 2023-10-03 PROCEDURE — 36415 COLL VENOUS BLD VENIPUNCTURE: CPT | Performed by: INTERNAL MEDICINE

## 2023-10-03 PROCEDURE — 2500000001 HC RX 250 WO HCPCS SELF ADMINISTERED DRUGS (ALT 637 FOR MEDICARE OP): Performed by: INTERNAL MEDICINE

## 2023-10-03 PROCEDURE — 94760 N-INVAS EAR/PLS OXIMETRY 1: CPT

## 2023-10-03 PROCEDURE — 97110 THERAPEUTIC EXERCISES: CPT | Mod: GP

## 2023-10-03 PROCEDURE — 94640 AIRWAY INHALATION TREATMENT: CPT

## 2023-10-03 PROCEDURE — 36415 COLL VENOUS BLD VENIPUNCTURE: CPT | Performed by: NURSE PRACTITIONER

## 2023-10-03 PROCEDURE — 3490 HC RX 250 GENERAL PHARMACY W/ HCPCS (ALT 636 FOR OP/ED): Performed by: INTERNAL MEDICINE

## 2023-10-03 PROCEDURE — 2500000004 HC RX 250 GENERAL PHARMACY W/ HCPCS (ALT 636 FOR OP/ED): Performed by: INTERNAL MEDICINE

## 2023-10-03 PROCEDURE — S4991 NICOTINE PATCH NONLEGEND: HCPCS | Performed by: INTERNAL MEDICINE

## 2023-10-03 PROCEDURE — 2500000005 HC RX 250 GENERAL PHARMACY W/O HCPCS: Performed by: INTERNAL MEDICINE

## 2023-10-03 PROCEDURE — 80048 BASIC METABOLIC PNL TOTAL CA: CPT | Performed by: PHYSICIAN ASSISTANT

## 2023-10-03 PROCEDURE — 2500000002 HC RX 250 W HCPCS SELF ADMINISTERED DRUGS (ALT 637 FOR MEDICARE OP, ALT 636 FOR OP/ED): Performed by: INTERNAL MEDICINE

## 2023-10-03 PROCEDURE — 1200000002 HC GENERAL ROOM WITH TELEMETRY DAILY

## 2023-10-03 RX ORDER — LANOLIN ALCOHOL/MO/W.PET/CERES
400 CREAM (GRAM) TOPICAL DAILY
Status: DISCONTINUED | OUTPATIENT
Start: 2023-10-03 | End: 2023-10-05

## 2023-10-03 RX ORDER — OXYBUTYNIN CHLORIDE 5 MG/1
5 TABLET ORAL 2 TIMES DAILY
Qty: 60 TABLET | Refills: 0 | Status: SHIPPED | OUTPATIENT
Start: 2023-10-03 | End: 2023-11-02

## 2023-10-03 RX ORDER — FUROSEMIDE 20 MG/1
20 TABLET ORAL DAILY
Qty: 30 TABLET | Refills: 0 | Status: SHIPPED | OUTPATIENT
Start: 2023-10-04 | End: 2023-11-03

## 2023-10-03 RX ADMIN — ATORVASTATIN CALCIUM 40 MG: 40 TABLET, FILM COATED ORAL at 20:29

## 2023-10-03 RX ADMIN — PANTOPRAZOLE SODIUM 40 MG: 40 TABLET, DELAYED RELEASE ORAL at 06:04

## 2023-10-03 RX ADMIN — OXYCODONE HYDROCHLORIDE 10 MG: 10 TABLET ORAL at 16:36

## 2023-10-03 RX ADMIN — PREGABALIN 150 MG: 150 CAPSULE ORAL at 08:44

## 2023-10-03 RX ADMIN — FLUTICASONE PROPIONATE 1 SPRAY: 50 SPRAY, METERED NASAL at 08:44

## 2023-10-03 RX ADMIN — FUROSEMIDE 20 MG: 20 TABLET ORAL at 08:46

## 2023-10-03 RX ADMIN — LIDOCAINE 1 PATCH: 4 PATCH TOPICAL at 08:44

## 2023-10-03 RX ADMIN — IPRATROPIUM BROMIDE AND ALBUTEROL SULFATE 3 ML: 2.5; .5 SOLUTION RESPIRATORY (INHALATION) at 14:13

## 2023-10-03 RX ADMIN — IPRATROPIUM BROMIDE AND ALBUTEROL SULFATE 3 ML: 2.5; .5 SOLUTION RESPIRATORY (INHALATION) at 08:43

## 2023-10-03 RX ADMIN — BUDESONIDE 0.5 MG: 0.5 INHALANT RESPIRATORY (INHALATION) at 08:00

## 2023-10-03 RX ADMIN — Medication 400 MG: at 16:36

## 2023-10-03 RX ADMIN — OXYCODONE HYDROCHLORIDE 10 MG: 10 TABLET ORAL at 08:45

## 2023-10-03 RX ADMIN — RIVAROXABAN 20 MG: 20 TABLET, FILM COATED ORAL at 16:36

## 2023-10-03 RX ADMIN — PREGABALIN 150 MG: 150 CAPSULE ORAL at 20:29

## 2023-10-03 RX ADMIN — LORATADINE 10 MG: 10 TABLET ORAL at 08:45

## 2023-10-03 RX ADMIN — METOPROLOL SUCCINATE 25 MG: 25 TABLET, EXTENDED RELEASE ORAL at 08:44

## 2023-10-03 RX ADMIN — OXYBUTYNIN CHLORIDE 5 MG: 5 TABLET ORAL at 20:29

## 2023-10-03 RX ADMIN — IPRATROPIUM BROMIDE AND ALBUTEROL SULFATE 3 ML: 2.5; .5 SOLUTION RESPIRATORY (INHALATION) at 20:54

## 2023-10-03 RX ADMIN — BUDESONIDE 0.5 MG: 0.5 INHALANT RESPIRATORY (INHALATION) at 20:54

## 2023-10-03 RX ADMIN — NICOTINE 1 PATCH: 21 PATCH, EXTENDED RELEASE TRANSDERMAL at 08:46

## 2023-10-03 RX ADMIN — OXYBUTYNIN CHLORIDE 5 MG: 5 TABLET ORAL at 08:46

## 2023-10-03 SDOH — SOCIAL STABILITY: SOCIAL INSECURITY: DOES ANYONE TRY TO KEEP YOU FROM HAVING/CONTACTING OTHER FRIENDS OR DOING THINGS OUTSIDE YOUR HOME?: NO

## 2023-10-03 SDOH — SOCIAL STABILITY: SOCIAL INSECURITY: DO YOU FEEL UNSAFE GOING BACK TO THE PLACE WHERE YOU ARE LIVING?: NO

## 2023-10-03 SDOH — SOCIAL STABILITY: SOCIAL INSECURITY: HAVE YOU HAD THOUGHTS OF HARMING ANYONE ELSE?: NO

## 2023-10-03 SDOH — SOCIAL STABILITY: SOCIAL INSECURITY: ARE THERE ANY APPARENT SIGNS OF INJURIES/BEHAVIORS THAT COULD BE RELATED TO ABUSE/NEGLECT?: NO

## 2023-10-03 SDOH — SOCIAL STABILITY: SOCIAL INSECURITY: ARE YOU OR HAVE YOU BEEN THREATENED OR ABUSED PHYSICALLY, EMOTIONALLY, OR SEXUALLY BY ANYONE?: NO

## 2023-10-03 SDOH — HEALTH STABILITY: MENTAL HEALTH: EXPERIENCED ANY OF THE FOLLOWING LIFE EVENTS: OTHER (COMMENT)

## 2023-10-03 SDOH — SOCIAL STABILITY: SOCIAL INSECURITY: HAS ANYONE EVER THREATENED TO HURT YOUR FAMILY OR YOUR PETS?: NO

## 2023-10-03 SDOH — SOCIAL STABILITY: SOCIAL INSECURITY: ABUSE: ADULT

## 2023-10-03 SDOH — SOCIAL STABILITY: SOCIAL INSECURITY: POSSIBLE ABUSE REPORTED TO:: OTHER (COMMENT)

## 2023-10-03 SDOH — SOCIAL STABILITY: SOCIAL INSECURITY: DO YOU FEEL ANYONE HAS EXPLOITED OR TAKEN ADVANTAGE OF YOU FINANCIALLY OR OF YOUR PERSONAL PROPERTY?: NO

## 2023-10-03 ASSESSMENT — PATIENT HEALTH QUESTIONNAIRE - PHQ9
1. LITTLE INTEREST OR PLEASURE IN DOING THINGS: NOT AT ALL
SUM OF ALL RESPONSES TO PHQ9 QUESTIONS 1 & 2: 0
2. FEELING DOWN, DEPRESSED OR HOPELESS: NOT AT ALL

## 2023-10-03 ASSESSMENT — COGNITIVE AND FUNCTIONAL STATUS - GENERAL
TURNING FROM BACK TO SIDE WHILE IN FLAT BAD: A LOT
MOVING FROM LYING ON BACK TO SITTING ON SIDE OF FLAT BED WITH BEDRAILS: A LITTLE
WALKING IN HOSPITAL ROOM: A LITTLE
STANDING UP FROM CHAIR USING ARMS: A LITTLE
CLIMB 3 TO 5 STEPS WITH RAILING: A LOT
MOBILITY SCORE: 16
MOVING TO AND FROM BED TO CHAIR: A LITTLE

## 2023-10-03 ASSESSMENT — PAIN DESCRIPTION - DESCRIPTORS: DESCRIPTORS: ACHING

## 2023-10-03 ASSESSMENT — LIFESTYLE VARIABLES
AUDIT-C TOTAL SCORE: 1
HOW MANY STANDARD DRINKS CONTAINING ALCOHOL DO YOU HAVE ON A TYPICAL DAY: 1 OR 2
PRESCIPTION_ABUSE_PAST_12_MONTHS: NO
SKIP TO QUESTIONS 9-10: 1
HOW OFTEN DO YOU HAVE 6 OR MORE DRINKS ON ONE OCCASION: NEVER
AUDIT-C TOTAL SCORE: 1
HOW OFTEN DO YOU HAVE A DRINK CONTAINING ALCOHOL: MONTHLY OR LESS
SUBSTANCE_ABUSE_PAST_12_MONTHS: NO

## 2023-10-03 ASSESSMENT — PAIN SCALES - GENERAL
PAINLEVEL_OUTOF10: 2
PAINLEVEL_OUTOF10: 7
PAINLEVEL_OUTOF10: 6

## 2023-10-03 ASSESSMENT — COLUMBIA-SUICIDE SEVERITY RATING SCALE - C-SSRS
6. HAVE YOU EVER DONE ANYTHING, STARTED TO DO ANYTHING, OR PREPARED TO DO ANYTHING TO END YOUR LIFE?: NO
1. IN THE PAST MONTH, HAVE YOU WISHED YOU WERE DEAD OR WISHED YOU COULD GO TO SLEEP AND NOT WAKE UP?: NO
2. HAVE YOU ACTUALLY HAD ANY THOUGHTS OF KILLING YOURSELF?: NO

## 2023-10-03 ASSESSMENT — PAIN SCALES - WONG BAKER: WONGBAKER_NUMERICALRESPONSE: HURTS WHOLE LOT

## 2023-10-03 ASSESSMENT — ACTIVITIES OF DAILY LIVING (ADL)
PATIENT'S MEMORY ADEQUATE TO SAFELY COMPLETE DAILY ACTIVITIES?: YES
ADEQUATE_TO_COMPLETE_ADL: YES
JUDGMENT_ADEQUATE_SAFELY_COMPLETE_DAILY_ACTIVITIES: YES

## 2023-10-03 ASSESSMENT — PAIN - FUNCTIONAL ASSESSMENT
PAIN_FUNCTIONAL_ASSESSMENT: 0-10
PAIN_FUNCTIONAL_ASSESSMENT: 0-10

## 2023-10-03 NOTE — PROGRESS NOTES
"Physical Therapy    Physical Therapy Treatment    Patient Name: Jarocho Luis  MRN: [id number]  Today's Date: 10/3/2023  Time Calculation  Start Time: 1329  Stop Time: 1359  Time Calculation (min): 30 min       Assessment/Plan   PT Assessment  PT Assessment Results: Decreased strength, Decreased range of motion, Decreased endurance, Decreased mobility  Evaluation/Treatment Tolerance:  (Pt.activity limited by fatgue.)  End of Session Patient Position: Up in chair, Alarm on       General Visit Information:   PT  Visit  PT Received On: 10/03/23     Subjective   Pt. Was seated in the chair when I arrived. He was agreeable to PT. He c/o right lateral hip and thigh pain, quadriceps felt like \"cramping\" with LAQ and Seated Marches.     Precautions:    Fall precautions,     Vital Signs:     Objective          Cognition:   Pt. Alert and oriented x4. Following instructions for activity.                      Activity Tolerance:  Activity Tolerance  Endurance: Tolerates 10 - 20 min exercise with multiple rests  Activity Tolerance Comments: Standing rest breaks    Treatments:  Therapeutic Exercise  Therapeutic Exercise Performed: Yes  Therapeutic Exercise Activity 1: Seated Heel/Toe Raises x 15 each  Therapeutic Exercise Activity 2: Seated Hip abd/add x 10  Therapeutic Exercise Activity 3: Seated Marches x10 each  Therapeutic Exercise Activity 4: LAQ x 10  Therapeutic Activity  Therapeutic Activity Performed:  (Sit to stand from the chair x 3 reps)     Bed Mobility  Bed Mobility:  (Pt. in the chair.)  Ambulation/Gait Training  Ambulation/Gait Training Performed: Yes  Ambulation/Gait Training 1  Surface 1: Level tile  Device 1: Rolling walker  Assistance 1: Contact guard, Minimum assistance  Quality of Gait 1:  (Slow, Pace.CGA/min to steady when turning around.)  Comments/Distance (ft) 1: Pt. ambulated 15'x2, then 20'x2.  Transfers  Transfer: Yes  Transfer 1  Transfer From 1:  (Sit to stand with Min Assist x1.Cuea for hand " placement.)          Outcome Measures:  [facility] Basic Mobility  Turning from your back to your side while in a flat bed without using bedrails: A little  Moving from lying on your back to sitting on the side of a flat bed without using bedrails: A lot  Moving to and from bed to chair (including a wheelchair): A little  Standing up from a chair using your arms (e.g. wheelchair or bedside chair): A little  To walk in hospital room: A little  Climbing 3-5 steps with railing: A lot  Basic Mobility - Total Score: 16  Education Documentation  Precautions, taught by Dirk Burns PTA at 10/3/2023  3:12 PM.  Learner: Patient  Readiness: Acceptance  Method: Explanation, Demonstration  Response: Demonstrated Understanding, Needs Reinforcement    Precautions, taught by Dirk Burns PTA at 10/3/2023  3:11 PM.  Learner: Patient  Readiness: Acceptance  Method: Demonstration, Explanation  Response: Needs Reinforcement, Verbalizes Understanding    Home Exercise Program, taught by Dirk Burns PTA at 10/3/2023  3:11 PM.  Learner: Patient  Readiness: Acceptance  Method: Demonstration, Explanation  Response: Needs Reinforcement, Verbalizes Understanding    Mobility Training, taught by Dirk Burns PTA at 10/3/2023  3:11 PM.  Learner: Patient  Readiness: Acceptance  Method: Demonstration, Explanation  Response: Needs Reinforcement, Verbalizes Understanding    Education Comments  No comments found.    Pt. Educated on importance of mobility. Cues for gait and pacing. Verbalized understanding but but needs reinforcement.     EDUCATION:     Encounter Problems       Encounter Problems (Active)       PT Problem       Pt will demonstrate mod I for all bed mobility  (Progressing)       Start:  09/30/23    Expected End:  10/13/23       Goal transcribed from [facility]           Pt will demonstrate mod I for all transfers with WW (Progressing)       Start:  09/30/23    Expected End:  10/13/23       Goal transcribed from [facility]           Pt will  ambulate 50 ft with WW and Tor.  (Progressing)       Start:  09/30/23    Expected End:  10/13/23       Goal transcribed from [facility]           Pt will be able to negotiation 7 steps with mod I. (Progressing)       Start:  09/30/23    Expected End:  10/13/23       Goal transcribed from [facility]           Pt will demonstrate BLE strength WFLs (Progressing)       Start:  09/30/23    Expected End:  10/13/23       Goal transcribed from [facility]              Pain - Adult

## 2023-10-03 NOTE — CARE PLAN
Problem: Discharge Planning  Goal: Discharge to home or other facility with appropriate resources  10/3/2023 1834 by Paco Gutierrez RN  Outcome: Progressing  10/3/2023 1834 by Paco Gutierrez RN  Outcome: Progressing  10/3/2023 1813 by Paco Gutierrez RN  Outcome: Progressing  10/3/2023 1226 by Paco Gutierrez RN  Outcome: Progressing  10/3/2023 1216 by Paco Gutierrez RN  Outcome: Progressing     Problem: Chronic Conditions and Co-morbidities  Goal: Patient's chronic conditions and co-morbidity symptoms are monitored and maintained or improved  10/3/2023 1834 by Paco Gutierrez RN  Outcome: Progressing  10/3/2023 1834 by Paco Gutierrez RN  Outcome: Progressing  10/3/2023 1813 by Paco Gutierrez RN  Outcome: Progressing  10/3/2023 1226 by Paco Gutierrez RN  Outcome: Progressing  10/3/2023 1216 by Paco Gutierrez RN  Outcome: Progressing     Problem: Pain  Goal: Takes deep breaths with improved pain control throughout the shift  10/3/2023 1834 by Paco Gutierrez RN  Outcome: Progressing  10/3/2023 1834 by Paco Gutierrez RN  Outcome: Progressing  10/3/2023 1813 by Paco Gutierrez RN  Outcome: Progressing  10/3/2023 1226 by Paco Gutierrez RN  Outcome: Progressing  10/3/2023 1216 by Paco Gutierrez RN  Outcome: Progressing  Goal: Walks with improved pain control throughout the shift  10/3/2023 1834 by Paco Gutierrez RN  Outcome: Progressing  10/3/2023 1834 by Paco Gutierrez RN  Outcome: Progressing  10/3/2023 1813 by Paco Gutierrez RN  Outcome: Progressing  10/3/2023 1226 by Paco Gutierrez RN  Outcome: Progressing  10/3/2023 1216 by Paco Gutierrez RN  Outcome: Progressing  Goal: Performs ADL's with improved pain control throughout shift  10/3/2023 1834 by Paco Gutierrez RN  Outcome: Progressing  10/3/2023 1834 by Paco Gutierrez RN  Outcome: Progressing  10/3/2023 1813 by Paco Gutierrez RN  Outcome: Progressing  10/3/2023 1226 by Paco  JUAN Gutierrez  Outcome: Progressing  10/3/2023 1216 by Paco Gutierrez RN  Outcome: Progressing  Goal: Participates in PT with improved pain control throughout the shift  10/3/2023 1834 by Paco Gutierrez RN  Outcome: Progressing  10/3/2023 1834 by Paco Gutierrez RN  Outcome: Progressing  10/3/2023 1813 by Paco Gutierrez RN  Outcome: Progressing  10/3/2023 1226 by Paco Gutierrez RN  Outcome: Progressing  10/3/2023 1216 by Paco Gutierrez RN  Outcome: Progressing   The patient's goals for the shift include      The clinical goals for the shift include not get up on his own

## 2023-10-03 NOTE — PROGRESS NOTES
INPATIENT NEPHROLOGY CONSULT PROGRESS NOTES    Patient Name: Jarocho Luis   MRN: [id number]  CONSULTING SERVICE: Nephrology    Impression :   This is a 62-year-old male with past medical history of hypertension, hyperlipidemia, history of DVT and PE, on Eliquis, lung cancer status post chemo and radiation presented to the hospital with multiple falls and patient was found to have severe hyponatremia and nephrology consulted for further evaluation and management.     1.  Hyponatremia secondary to hypovolemia and poor solute intake, and has some component of SIADH.    2.  Hypomagnesemia.  3.  Anemia.  4.  History of lung cancer status post chemo and radiation.  5.  History of DVT and PE.  6.  Hyperlipidemia.  7.  Hypertension.    Plan.  --Renal function has been stable with creatinine around 0.6 mg/dL.  -Hyponatremia, serum sodium has been stable around 132-133 and patient is currently Lasix 20 mg p.o. daily.  -Continue with protein supplements.  -Hypertension: Blood pressure has been optimally controlled, continue metoprolol 25 mg p.o. daily.    Thank you for the consultation and will follow with you closely.  Okay to discharge from nephrology standpoint.  ================================================================  INTERVAL HPI: The patient was seen and examined. No acute event overnight.  Denies any complaints, chest pain, shortness of breath, nausea, vomiting, abdominal pain, headache, dizziness.  PERTINENT ROS:   GENERAL: No fever/chills.  RESPIRATORY: Negative for cough, wheezing or shortness of breath.  CARDIOVASCULAR: Negative for chest pain or palpitations.  GI: Negative for nausea, vomiting,  Diarrhea, abdominal pain.    : Negative for dysuria and hematuria    MEDICATIONS:  Current active Inpatient Medication reviewed.   Current Facility-Administered Medications   Medication Dose Route Frequency Provider Last Rate Last Admin    acetaminophen (Tylenol) tablet 650 mg  650 mg oral q6h PRN Sony LARKIN  MD Thai        albuterol 2.5 mg /3 mL (0.083 %) nebulizer solution 2.5 mg  2.5 mg nebulization q6h PRN Sony Andre MD        atorvastatin (Lipitor) tablet 40 mg  40 mg oral Nightly Sony Andre MD   40 mg at 10/02/23 2024    budesonide (Pulmicort) 0.5 mg/2 mL nebulizer solution 0.5 mg  0.5 mg nebulization BID Sony Andre MD   0.5 mg at 10/03/23 0800    fluticasone (Flonase) nasal spray 1 spray  1 spray Each Nostril Daily Sony Andre MD   1 spray at 10/03/23 0844    furosemide (Lasix) tablet 20 mg  20 mg oral Daily Franklin Frazier MD   20 mg at 10/03/23 0846    ipratropium-albuteroL (Duo-Neb) 0.5-2.5 mg/3 mL nebulizer solution 3 mL  3 mL nebulization TID Sony Andre MD   3 mL at 10/03/23 0843    lidocaine 4 % patch 1 patch  1 patch transdermal Daily Sony Andre MD   1 patch at 10/03/23 0844    loratadine (Claritin) tablet 10 mg  10 mg oral Daily Sony Andre MD   10 mg at 10/03/23 0845    metoprolol succinate XL (Toprol-XL) 24 hr tablet 25 mg  25 mg oral Daily Sony Andre MD   25 mg at 10/03/23 0844    nicotine (Nicoderm CQ) 21 mg/24 hr patch 1 patch  1 patch transdermal Daily Sony Andre MD   1 patch at 10/03/23 0846    oxybutynin (Ditropan) tablet 5 mg  5 mg oral BID Sony Andre MD   5 mg at 10/03/23 0846    oxyCODONE (Roxicodone) immediate release tablet 10 mg  10 mg oral q8h PRN Sony Andre MD   10 mg at 10/03/23 0845    oxyCODONE (Roxicodone) immediate release tablet 10 mg  10 mg oral q8h PRN Sony Andre MD        pantoprazole (ProtoNix) EC tablet 40 mg  40 mg oral Daily before breakfast Sony Andre MD   40 mg at 10/03/23 0604    pregabalin (Lyrica) capsule 150 mg  150 mg oral BID Sony Andre MD   150 mg at 10/03/23 0844    rivaroxaban (Xarelto) tablet 20 mg  20 mg oral Daily with evening meal Sony Andre MD   20 mg at 10/02/23 1711    sennosides-docusate sodium (Brenda-Colace) 8.6-50 mg  "per tablet 2 tablet  2 tablet oral Nightly PRN Sony Andre MD           PHYSICAL EXAM:   BP 95/53 (BP Location: Left arm)   Pulse 92   Temp 36.4 °C (97.5 °F) (Temporal)   Resp 18   Ht 1.828 m (5' 11.97\")   Wt 134 kg (295 lb 6.7 oz)   SpO2 94%   BMI 40.10 kg/m²   Patient Vitals for the past 24 hrs:   BP   10/03/23 1200 95/53   10/03/23 0800 144/85   10/03/23 0000 (!) 98/49   10/02/23 2000 113/65   10/02/23 1545 133/75       Intake/Output Summary (Last 24 hours) at 10/3/2023 1402  Last data filed at 10/3/2023 1158  Gross per 24 hour   Intake 0 ml   Output 1275 ml   Net -1275 ml     GENERAL: Alert, no distress, cooperative  SKIN: Skin color, texture, turgor normal. No rashes or lesions.  HEAD/SINUSES: No significant findings  EYES: PE  OROPHARYNX: oral mucosa moist. Oropharynx normal.  NECK: No jugulovenous distention, No carotid bruits, Supple  LUNGS: Lungs clear to auscultation, no wheeze, rhonchi, or rales  CARDIAC: Normal S1 and S2; no rubs, murmurs, or gallops  ABDOMEN: Abdomen soft, non-tender, BS normal, No masses. No abdominal bruits.  EXTREMITIES: Normal exam of the extremities.   NEURO: No focal deficits. Able to move all 4 extremities.  PULSES: 2+ radial, 2 + femoral    DATA:   Diagnostic tests reviewed for today's visit:        Urobilinogen, Urine   Date Value Ref Range Status   10/02/2023 <2.0 <2.0 mg/dL Final       No components found for: \"PROTTIMED\", \"UALBCR\", \"UPROT\", \"CREAT\"    No results found for: \"CHOL\", \"HDL\", \"LDL\"  IMAGING:  reviewed in  images    SIGNATURE: Franklin Frazier MD  "

## 2023-10-03 NOTE — CARE PLAN
The patient's goals for the shift include      Problem: Pain  Goal: Participates in PT with improved pain control throughout the shift  10/3/2023 1813 by Paco Gutierrez RN  Outcome: Progressing  10/3/2023 1226 by Paco Gutierrez RN  Outcome: Progressing  10/3/2023 1216 by Paco Gutierrez RN  Outcome: Progressing     Problem: Pain  Goal: Performs ADL's with improved pain control throughout shift  10/3/2023 1813 by Paco Gutierrez RN  Outcome: Progressing  10/3/2023 1226 by Paco Gutierrez RN  Outcome: Progressing  10/3/2023 1216 by Paco Gutierrez RN  Outcome: Progressing     Problem: Pain  Goal: Takes deep breaths with improved pain control throughout the shift  10/3/2023 1813 by Paco Gutierrez RN  Outcome: Progressing  10/3/2023 1226 by Paco Gutierrez RN  Outcome: Progressing  10/3/2023 1216 by Paco Gutierrez RN  Outcome: Progressing     The clinical goals for the shift include not get up on his own

## 2023-10-03 NOTE — CARE PLAN
Problem: Discharge Planning  Goal: Discharge to home or other facility with appropriate resources  Outcome: Progressing     Problem: Chronic Conditions and Co-morbidities  Goal: Patient's chronic conditions and co-morbidity symptoms are monitored and maintained or improved  Outcome: Progressing     Problem: Pain  Goal: Takes deep breaths with improved pain control throughout the shift  Outcome: Progressing  Goal: Walks with improved pain control throughout the shift  Outcome: Progressing  Goal: Performs ADL's with improved pain control throughout shift  Outcome: Progressing  Goal: Participates in PT with improved pain control throughout the shift  Outcome: Progressing   The patient's goals for the shift include      The clinical goals for the shift include not get up on his own

## 2023-10-03 NOTE — PROGRESS NOTES
"Subjective  Patient is doing okay continue to be mildly confused  Nursing staff was interviewed  Objectives    Last Recorded Vitals  Blood pressure (!) 98/49, pulse 86, temperature 36.3 °C (97.3 °F), temperature source Temporal, resp. rate 16, height 1.828 m (5' 11.97\"), weight 135 kg (298 lb 11.6 oz), SpO2 97 %.    Physical Exam  Constitutional:       General: He is awake.      Appearance: He is diaphoretic.   HENT:      Right Ear: External ear normal.      Left Ear: External ear normal.      Mouth/Throat:      Mouth: Mucous membranes are moist.   Cardiovascular:      Rate and Rhythm: Normal rate and regular rhythm.      Heart sounds: No murmur heard.     No friction rub. No gallop.   Pulmonary:      Effort: No accessory muscle usage or respiratory distress.      Breath sounds: No stridor. No wheezing or rhonchi.   Chest:      Chest wall: No tenderness.   Abdominal:      General: There is no distension.      Palpations: There is no mass.      Tenderness: There is no abdominal tenderness. There is no guarding or rebound.   Musculoskeletal:         General: No deformity or signs of injury.      Cervical back: No rigidity or tenderness. Normal range of motion.      Right lower leg: No edema.      Left lower leg: No edema.   Skin:     Coloration: Skin is not jaundiced or pale.      Findings: No lesion.   Neurological:      General: No focal deficit present.      Mental Status: He is oriented to person, place, and time.      Cranial Nerves: No cranial nerve deficit.      Sensory: No sensory deficit.      Motor: No weakness.          Labs    No results displayed because visit has over 200 results.            Imaging     Electrocardiogram 12 Lead  Sinus rhythm with 1st degree AV block  Right axis deviation  Anteroseptal infarct , age undetermined  Abnormal ECG  When compared with ECG of 08-FEB-2020 08:00,  ME interval has increased  QRS axis Shifted right  Anteroseptal infarct is now Present  Nonspecific T wave " abnormality now evident in Anterior leads  Confirmed by CAYETANO Rosa ([id number]) on 9/29/2023 7:39:51 PM       Patient Active Problem List   Diagnosis    Weakness         Assessment/Plan   Principal Problem:    Weakness      Continue with physical therapy  Hyponatremia follow-up sodium level  History of lung cancer continue to monitor  History of hypertension DVT continue to monitor       I spent 25 minutes in the professional and overall care of this patient.      CAYETANO Galvan MD

## 2023-10-03 NOTE — CARE PLAN
Problem: Pain  Goal: Takes deep breaths with improved pain control throughout the shift  Outcome: Progressing  Goal: Walks with improved pain control throughout the shift  Outcome: Progressing  Goal: Performs ADL's with improved pain control throughout shift  Outcome: Progressing  Goal: Participates in PT with improved pain control throughout the shift  Outcome: Progressing     Problem: Discharge Planning  Goal: Discharge to home or other facility with appropriate resources  Outcome: Progressing     Problem: Chronic Conditions and Co-morbidities  Goal: Patient's chronic conditions and co-morbidity symptoms are monitored and maintained or improved  Outcome: Progressing   The patient's goals for the shift include      The clinical goals for the shift include not get up on his own    Over the shift, the patient did not make progress toward the following goals. Barriers to progression include pain. Recommendations to address these barriers include pain control.     Cephalic

## 2023-10-04 LAB
ALBUMIN SERPL BCP-MCNC: 3.3 G/DL (ref 3.4–5)
ANION GAP IN SER/PLAS: NORMAL
ANION GAP SERPL CALC-SCNC: 10 MMOL/L (ref 10–20)
ANION GAP SERPL CALC-SCNC: 13 MMOL/L (ref 10–20)
BUN SERPL-MCNC: 10 MG/DL (ref 6–23)
BUN SERPL-MCNC: 10 MG/DL (ref 6–23)
C REACTIVE PROTEIN (MG/L) IN SER/PLAS: NORMAL
CALCIUM (MG/DL) IN SER/PLAS: NORMAL
CALCIUM SERPL-MCNC: 9.5 MG/DL (ref 8.6–10.3)
CALCIUM SERPL-MCNC: 9.6 MG/DL (ref 8.6–10.3)
CARBON DIOXIDE, TOTAL (MMOL/L) IN SER/PLAS: NORMAL
CHLORIDE (MMOL/L) IN SER/PLAS: NORMAL
CHLORIDE SERPL-SCNC: 100 MMOL/L (ref 98–107)
CHLORIDE SERPL-SCNC: 100 MMOL/L (ref 98–107)
CO2 SERPL-SCNC: 24 MMOL/L (ref 21–32)
CO2 SERPL-SCNC: 26 MMOL/L (ref 21–32)
CREAT SERPL-MCNC: 0.73 MG/DL (ref 0.5–1.3)
CREAT SERPL-MCNC: 0.76 MG/DL (ref 0.5–1.3)
CREATININE (MG/DL) IN SER/PLAS: NORMAL
ERYTHROCYTE DISTRIBUTION WIDTH (RATIO) BY AUTOMATED COUNT: NORMAL
ERYTHROCYTE MEAN CORPUSCULAR HEMOGLOBIN CONCENTRATION (G/DL) BY AUTOMATED: NORMAL
ERYTHROCYTE MEAN CORPUSCULAR VOLUME (FL) BY AUTOMATED COUNT: NORMAL
ERYTHROCYTE [DISTWIDTH] IN BLOOD BY AUTOMATED COUNT: 12.2 % (ref 11.5–14.5)
ERYTHROCYTES (10*6/UL) IN BLOOD BY AUTOMATED COUNT: NORMAL
ESTIMATED AVERAGE GLUCOSE FOR HBA1C: NORMAL
GFR FEMALE: NORMAL
GFR MALE: NORMAL
GFR SERPL CREATININE-BSD FRML MDRD: >90 ML/MIN/1.73M*2
GFR SERPL CREATININE-BSD FRML MDRD: >90 ML/MIN/1.73M*2
GLOMERULAR FILTRATION RATE-AFRICAN AMERICAN: NORMAL ML/MIN/1.73M2
GLOMERULAR FILTRATION RATE-NON AFRICAN AMERICAN: NORMAL ML/MIN/1.73M2
GLUCOSE (MG/DL) IN SER/PLAS: NORMAL
GLUCOSE SERPL-MCNC: 79 MG/DL (ref 74–99)
GLUCOSE SERPL-MCNC: 80 MG/DL (ref 74–99)
HCT VFR BLD AUTO: 40.7 % (ref 41–52)
HEMATOCRIT (%) IN BLOOD BY AUTOMATED COUNT: NORMAL
HEMOGLOBIN (G/DL) IN BLOOD: NORMAL
HEMOGLOBIN A1C/HEMOGLOBIN TOTAL IN BLOOD: NORMAL
HGB A1C-DATA CONVERSION: NORMAL %
HGB BLD-MCNC: 13.2 G/DL (ref 13.5–17.5)
LEUKOCYTES (10*3/UL) IN BLOOD BY AUTOMATED COUNT: NORMAL
MAGNESIUM (MG/DL) IN SER/PLAS: NORMAL
MAGNESIUM SERPL-MCNC: 1.62 MG/DL (ref 1.6–2.4)
MCH RBC QN AUTO: 29.3 PG (ref 26–34)
MCHC RBC AUTO-ENTMCNC: 32.4 G/DL (ref 32–36)
MCV RBC AUTO: 90 FL (ref 80–100)
NRBC (PER 100 WBCS) BY AUTOMATED COUNT: NORMAL
NRBC BLD-RTO: 0 /100 WBCS (ref 0–0)
PHOSPHATE SERPL-MCNC: 4.5 MG/DL (ref 2.5–4.9)
PLATELET # BLD AUTO: 196 X10*3/UL (ref 150–450)
PLATELETS (10*3/UL) IN BLOOD AUTOMATED COUNT: NORMAL
PMV BLD AUTO: 10.2 FL (ref 7.5–11.5)
POTASSIUM (MMOL/L) IN SER/PLAS: NORMAL
POTASSIUM SERPL-SCNC: 4.3 MMOL/L (ref 3.5–5.3)
POTASSIUM SERPL-SCNC: 4.3 MMOL/L (ref 3.5–5.3)
RBC # BLD AUTO: 4.51 X10*6/UL (ref 4.5–5.9)
SODIUM (MMOL/L) IN SER/PLAS: NORMAL
SODIUM SERPL-SCNC: 132 MMOL/L (ref 136–145)
SODIUM SERPL-SCNC: 133 MMOL/L (ref 136–145)
THYROTROPIN (MIU/L) IN SER/PLAS BY DETECTION LIMIT <= 0.05 MIU/L: NORMAL
UREA NITROGEN (MG/DL) IN SER/PLAS: NORMAL
WBC # BLD AUTO: 5.5 X10*3/UL (ref 4.4–11.3)

## 2023-10-04 PROCEDURE — 3490 HC RX 250 GENERAL PHARMACY W/ HCPCS (ALT 636 FOR OP/ED): Performed by: INTERNAL MEDICINE

## 2023-10-04 PROCEDURE — 2500000002 HC RX 250 W HCPCS SELF ADMINISTERED DRUGS (ALT 637 FOR MEDICARE OP, ALT 636 FOR OP/ED): Performed by: INTERNAL MEDICINE

## 2023-10-04 PROCEDURE — 2500000005 HC RX 250 GENERAL PHARMACY W/O HCPCS: Performed by: INTERNAL MEDICINE

## 2023-10-04 PROCEDURE — 94640 AIRWAY INHALATION TREATMENT: CPT

## 2023-10-04 PROCEDURE — S4991 NICOTINE PATCH NONLEGEND: HCPCS | Performed by: INTERNAL MEDICINE

## 2023-10-04 PROCEDURE — 36415 COLL VENOUS BLD VENIPUNCTURE: CPT | Performed by: NURSE PRACTITIONER

## 2023-10-04 PROCEDURE — 2500000004 HC RX 250 GENERAL PHARMACY W/ HCPCS (ALT 636 FOR OP/ED): Performed by: PHYSICIAN ASSISTANT

## 2023-10-04 PROCEDURE — 2500000004 HC RX 250 GENERAL PHARMACY W/ HCPCS (ALT 636 FOR OP/ED): Performed by: INTERNAL MEDICINE

## 2023-10-04 PROCEDURE — 1200000002 HC GENERAL ROOM WITH TELEMETRY DAILY

## 2023-10-04 PROCEDURE — 2500000001 HC RX 250 WO HCPCS SELF ADMINISTERED DRUGS (ALT 637 FOR MEDICARE OP): Performed by: INTERNAL MEDICINE

## 2023-10-04 RX ADMIN — IPRATROPIUM BROMIDE AND ALBUTEROL SULFATE 3 ML: 2.5; .5 SOLUTION RESPIRATORY (INHALATION) at 15:43

## 2023-10-04 RX ADMIN — OXYCODONE HYDROCHLORIDE 10 MG: 10 TABLET ORAL at 00:33

## 2023-10-04 RX ADMIN — OXYCODONE HYDROCHLORIDE 10 MG: 10 TABLET ORAL at 08:53

## 2023-10-04 RX ADMIN — METOPROLOL SUCCINATE 25 MG: 25 TABLET, EXTENDED RELEASE ORAL at 08:41

## 2023-10-04 RX ADMIN — PREGABALIN 150 MG: 150 CAPSULE ORAL at 08:41

## 2023-10-04 RX ADMIN — PANTOPRAZOLE SODIUM 40 MG: 40 TABLET, DELAYED RELEASE ORAL at 06:18

## 2023-10-04 RX ADMIN — RIVAROXABAN 20 MG: 20 TABLET, FILM COATED ORAL at 17:54

## 2023-10-04 RX ADMIN — Medication 400 MG: at 08:42

## 2023-10-04 RX ADMIN — BUDESONIDE 0.5 MG: 0.5 INHALANT RESPIRATORY (INHALATION) at 19:47

## 2023-10-04 RX ADMIN — PREGABALIN 150 MG: 150 CAPSULE ORAL at 20:27

## 2023-10-04 RX ADMIN — FLUTICASONE PROPIONATE 1 SPRAY: 50 SPRAY, METERED NASAL at 08:42

## 2023-10-04 RX ADMIN — OXYCODONE HYDROCHLORIDE 10 MG: 10 TABLET ORAL at 17:55

## 2023-10-04 RX ADMIN — IPRATROPIUM BROMIDE AND ALBUTEROL SULFATE 3 ML: 2.5; .5 SOLUTION RESPIRATORY (INHALATION) at 08:57

## 2023-10-04 RX ADMIN — FUROSEMIDE 20 MG: 20 TABLET ORAL at 08:42

## 2023-10-04 RX ADMIN — OXYBUTYNIN CHLORIDE 5 MG: 5 TABLET ORAL at 08:41

## 2023-10-04 RX ADMIN — NICOTINE 1 PATCH: 21 PATCH, EXTENDED RELEASE TRANSDERMAL at 08:42

## 2023-10-04 RX ADMIN — LORATADINE 10 MG: 10 TABLET ORAL at 08:41

## 2023-10-04 RX ADMIN — ACETAMINOPHEN 325MG 650 MG: 325 TABLET ORAL at 20:30

## 2023-10-04 RX ADMIN — LIDOCAINE 1 PATCH: 4 PATCH TOPICAL at 08:42

## 2023-10-04 RX ADMIN — BUDESONIDE 0.5 MG: 0.5 INHALANT RESPIRATORY (INHALATION) at 08:57

## 2023-10-04 RX ADMIN — IPRATROPIUM BROMIDE AND ALBUTEROL SULFATE 3 ML: 2.5; .5 SOLUTION RESPIRATORY (INHALATION) at 19:47

## 2023-10-04 RX ADMIN — ATORVASTATIN CALCIUM 40 MG: 40 TABLET, FILM COATED ORAL at 20:27

## 2023-10-04 RX ADMIN — OXYBUTYNIN CHLORIDE 5 MG: 5 TABLET ORAL at 20:27

## 2023-10-04 ASSESSMENT — COGNITIVE AND FUNCTIONAL STATUS - GENERAL
MOVING TO AND FROM BED TO CHAIR: A LITTLE
EATING MEALS: A LITTLE
MOBILITY SCORE: 15
TOILETING: A LITTLE
PERSONAL GROOMING: A LITTLE
DRESSING REGULAR UPPER BODY CLOTHING: A LITTLE
HELP NEEDED FOR BATHING: A LITTLE
DAILY ACTIVITIY SCORE: 17
DRESSING REGULAR LOWER BODY CLOTHING: A LOT
CLIMB 3 TO 5 STEPS WITH RAILING: A LOT
STANDING UP FROM CHAIR USING ARMS: A LOT
TURNING FROM BACK TO SIDE WHILE IN FLAT BAD: A LOT
MOVING FROM LYING ON BACK TO SITTING ON SIDE OF FLAT BED WITH BEDRAILS: A LITTLE
WALKING IN HOSPITAL ROOM: A LITTLE

## 2023-10-04 ASSESSMENT — PAIN SCALES - GENERAL
PAINLEVEL_OUTOF10: 6
PAINLEVEL_OUTOF10: 3
PAINLEVEL_OUTOF10: 6
PAINLEVEL_OUTOF10: 6

## 2023-10-04 ASSESSMENT — PAIN SCALES - WONG BAKER: WONGBAKER_NUMERICALRESPONSE: HURTS EVEN MORE

## 2023-10-04 ASSESSMENT — PAIN - FUNCTIONAL ASSESSMENT: PAIN_FUNCTIONAL_ASSESSMENT: 0-10

## 2023-10-04 NOTE — PROGRESS NOTES
INPATIENT NEPHROLOGY CONSULT PROGRESS NOTES    Patient Name: Jarocho Luis   MRN: [id number]  CONSULTING SERVICE: Nephrology    Impression :   This is a 62-year-old male with past medical history of hypertension, hyperlipidemia, history of DVT and PE, on Eliquis, lung cancer status post chemo and radiation presented to the hospital with multiple falls and patient was found to have severe hyponatremia and nephrology consulted for further evaluation and management.     1.  Hyponatremia secondary to hypovolemia and poor solute intake, and has some component of SIADH.    2.  Hypomagnesemia.  3.  Anemia.  4.  History of lung cancer status post chemo and radiation.  5.  History of DVT and PE.  6.  Hyperlipidemia.  7.  Hypertension.    Plan.  -Renal function remained stable.  -Hyponatremia, serum sodium has been stable around 132 and continue on Lasix 20 mg p.o. daily.  -Continue with protein supplements.  -Hypertension: Blood pressure has been optimally controlled, continue metoprolol 25 mg p.o. daily.    Thank you for the consultation and will follow with you closely.  Okay to discharge from nephrology standpoint.  ================================================================  INTERVAL HPI: The patient was seen and examined. No acute event overnight.  Denies any complaints, chest pain, shortness of breath, nausea, vomiting, abdominal pain, headache, dizziness.  PERTINENT ROS:   GENERAL: No fever/chills.  RESPIRATORY: Negative for cough, wheezing or shortness of breath.  CARDIOVASCULAR: Negative for chest pain or palpitations.  GI: Negative for nausea, vomiting,  Diarrhea, abdominal pain.    : Negative for dysuria and hematuria    MEDICATIONS:  Current active Inpatient Medication reviewed.   Current Facility-Administered Medications   Medication Dose Route Frequency Provider Last Rate Last Admin    acetaminophen (Tylenol) tablet 650 mg  650 mg oral q6h PRN Sony Andre MD        albuterol 2.5 mg /3 mL (0.083  %) nebulizer solution 2.5 mg  2.5 mg nebulization q6h PRN Sony Andre MD        atorvastatin (Lipitor) tablet 40 mg  40 mg oral Nightly Sony Andre MD   40 mg at 10/03/23 2029    budesonide (Pulmicort) 0.5 mg/2 mL nebulizer solution 0.5 mg  0.5 mg nebulization BID Sony Adnre MD   0.5 mg at 10/04/23 0857    fluticasone (Flonase) nasal spray 1 spray  1 spray Each Nostril Daily Sony Andre MD   1 spray at 10/04/23 0842    furosemide (Lasix) tablet 20 mg  20 mg oral Daily Franklin Frazier MD   20 mg at 10/04/23 0842    ipratropium-albuteroL (Duo-Neb) 0.5-2.5 mg/3 mL nebulizer solution 3 mL  3 mL nebulization TID Sony Andre MD   3 mL at 10/04/23 0857    lidocaine 4 % patch 1 patch  1 patch transdermal Daily Sony Andre MD   1 patch at 10/04/23 0842    loratadine (Claritin) tablet 10 mg  10 mg oral Daily Sony Andre MD   10 mg at 10/04/23 0841    magnesium oxide (Mag-Ox) tablet 400 mg  400 mg oral Daily Aisha Ibrahim PA-C   400 mg at 10/04/23 0842    metoprolol succinate XL (Toprol-XL) 24 hr tablet 25 mg  25 mg oral Daily Sony Andre MD   25 mg at 10/04/23 0841    nicotine (Nicoderm CQ) 21 mg/24 hr patch 1 patch  1 patch transdermal Daily Sony Andre MD   1 patch at 10/04/23 0842    oxybutynin (Ditropan) tablet 5 mg  5 mg oral BID Sony Andre MD   5 mg at 10/04/23 0841    oxyCODONE (Roxicodone) immediate release tablet 10 mg  10 mg oral q8h PRN Sony Andre MD   10 mg at 10/03/23 0845    oxyCODONE (Roxicodone) immediate release tablet 10 mg  10 mg oral q8h PRN Sony Andre MD   10 mg at 10/04/23 0853    pantoprazole (ProtoNix) EC tablet 40 mg  40 mg oral Daily before breakfast Sony Andre MD   40 mg at 10/04/23 0618    pregabalin (Lyrica) capsule 150 mg  150 mg oral BID Sony Andre MD   150 mg at 10/04/23 0841    rivaroxaban (Xarelto) tablet 20 mg  20 mg oral Daily with evening meal Sony Andre MD    "20 mg at 10/03/23 1636    sennosides-docusate sodium (Brenda-Colace) 8.6-50 mg per tablet 2 tablet  2 tablet oral Nightly PRN Sony Andre MD           PHYSICAL EXAM:   /58 (BP Location: Right arm, Patient Position: Lying)   Pulse 82   Temp 36.6 °C (97.9 °F) (Temporal)   Resp 19   Ht 1.828 m (5' 11.97\")   Wt 134 kg (295 lb 6.7 oz)   SpO2 95%   BMI 40.10 kg/m²   Patient Vitals for the past 24 hrs:   BP   10/04/23 0800 122/58   10/04/23 0015 107/56   10/03/23 2000 125/68   10/03/23 1600 108/56       Intake/Output Summary (Last 24 hours) at 10/4/2023 1325  Last data filed at 10/4/2023 0454  Gross per 24 hour   Intake --   Output 2100 ml   Net -2100 ml     GENERAL: Alert, no distress, cooperative  SKIN: Skin color, texture, turgor normal. No rashes or lesions.  HEAD/SINUSES: No significant findings  EYES: PE  OROPHARYNX: oral mucosa moist. Oropharynx normal.  NECK: No jugulovenous distention, No carotid bruits, Supple  LUNGS: Lungs clear to auscultation, no wheeze, rhonchi, or rales  CARDIAC: Normal S1 and S2; no rubs, murmurs, or gallops  ABDOMEN: Abdomen soft, non-tender, BS normal, No masses. No abdominal bruits.  EXTREMITIES: Normal exam of the extremities.   NEURO: No focal deficits. Able to move all 4 extremities.  PULSES: 2+ radial, 2 + femoral    DATA:   Diagnostic tests reviewed for today's visit:        Urobilinogen, Urine   Date Value Ref Range Status   10/02/2023 <2.0 <2.0 mg/dL Final       No components found for: \"PROTTIMED\", \"UALBCR\", \"UPROT\", \"CREAT\"    No results found for: \"CHOL\", \"HDL\", \"LDL\"  IMAGING:  reviewed in  images    SIGNATURE: Franklin Frazier MD  "

## 2023-10-04 NOTE — CARE PLAN
Problem: Pain  Goal: Takes deep breaths with improved pain control throughout the shift  Outcome: Progressing  Goal: Walks with improved pain control throughout the shift  Outcome: Progressing  Goal: Performs ADL's with improved pain control throughout shift  Outcome: Progressing  Goal: Participates in PT with improved pain control throughout the shift  Outcome: Progressing   The patient's goals for the shift include      The clinical goals for the shift include not get up on his own

## 2023-10-04 NOTE — PROGRESS NOTES
Spoke with the patient at the bedside regarding a discharge plan. He is considering [facility], but wants his family to visit the facility first. [facility] also [facility]. He lives alone and uses a can but recently started using a wheeled walker. [facility] is able [facility], received education, and takes as ordered. Patient does not want me to send a referral to the facility just yet.                 Dannie Lowe RN

## 2023-10-04 NOTE — PROGRESS NOTES
Occupational Therapy                 Therapy Communication Note    Patient Name: Jarocho Luis  MRN: [id number]  Today's Date: 10/4/2023     Discipline: Occupational Therapy    Missed Visit Reason:  Pt refused    Missed Time: Attempt    Comment: Pt declining participation in therapy at this time, stating that his R side is burning and he just returned to bed. Nursing aware of pt's pain.

## 2023-10-04 NOTE — DISCHARGE SUMMARY
Discharge Diagnosis  Weakness    Issues Requiring Follow-Up  Patient continues to have bilateral hip and knee pain with difficulty walking    Discharge Meds     Your medication list        START taking these medications        Instructions Last Dose Given Next Dose Due   furosemide 20 mg tablet  Commonly known as: Lasix      Take 1 tablet (20 mg) by mouth once daily. Do not start before October 4, 2023.       oxybutynin 5 mg tablet  Commonly known as: Ditropan      Take 1 tablet (5 mg) by mouth 2 times a day.              CONTINUE taking these medications        Instructions Last Dose Given Next Dose Due   atorvastatin 40 mg tablet  Commonly known as: Lipitor           Breztri Aerosphere 160-9-4.8 mcg/actuation HFA aerosol inhaler  Generic drug: budesonide-glycopyr-formoterol           fluticasone 50 mcg/actuation nasal spray  Commonly known as: Flonase           loratadine 10 mg tablet  Commonly known as: Claritin           metoprolol succinate XL 25 mg 24 hr tablet  Commonly known as: Toprol-XL           omeprazole 20 mg DR capsule  Commonly known as: PriLOSEC           oxyCODONE 10 mg immediate release tablet  Commonly known as: Roxicodone           pregabalin 150 mg capsule  Commonly known as: Lyrica           ProAir HFA 90 mcg/actuation inhaler  Generic drug: albuterol           sennosides-docusate sodium 8.6-50 mg tablet  Commonly known as: Brenda-Colace           sildenafil 100 mg tablet  Commonly known as: Viagra           tolterodine 1 mg tablet  Commonly known as: Detrol           Xarelto 20 mg tablet  Generic drug: rivaroxaban                     Where to Get Your Medications        These medications were sent to [id number] - [place], [place] - [address] [facility]  [address] [address] [facility] [address]      Phone: [phone]   furosemide 20 mg tablet  oxybutynin 5 mg tablet         Test Results Pending At Discharge  Pending Labs       Order Current Status    Add-On Test Request Collected  Pt had questions regarding [facility] MDPP program.    Her insurance stated the program needed to be through the [employer].  Per pt's request provided her info from [employer]'s website referencing the CDC's prediabetes program.  Pt will confirm w/insurance & reach out.     -GN   (09/28/23 2120)    Extra Tubes In process    Urine [place] Tube In process    Blood Culture Preliminary result            Hospital Course   Patient was admitted to the hospital secondary to bilateral hip pain bilateral knee pain physical therapy evaluated the patient and recommended skilled nursing facility for the patient patient was found to have hyponatremia hypomagnesemia electrolyte were replaced patient will be transferred to skilled nursing facility      Pertinent Physical Exam At Time of Discharge  Physical Exam  HENT:      Right Ear: External ear normal.      Left Ear: External ear normal.      Mouth/Throat:      Mouth: Mucous membranes are moist.   Cardiovascular:      Rate and Rhythm: Normal rate and regular rhythm.      Heart sounds: No murmur heard.     No friction rub. No gallop.   Pulmonary:      Effort: No accessory muscle usage or respiratory distress.      Breath sounds: No stridor. No wheezing or rhonchi.   Chest:      Chest wall: No tenderness.   Abdominal:      General: There is no distension.      Palpations: There is no mass.      Tenderness: There is no abdominal tenderness. There is no guarding or rebound.   Musculoskeletal:         General: No deformity or signs of injury.      Cervical back: No rigidity or tenderness. Normal range of motion.      Right lower leg: No edema.      Left lower leg: No edema.   Skin:     Coloration: Skin is not jaundiced or pale.      Findings: No lesion.   Neurological:      General: No focal deficit present.      Mental Status: He is alert, oriented to person, place, and time and easily aroused.      Cranial Nerves: No cranial nerve deficit.      Sensory: No sensory deficit.      Motor: No weakness.         Outpatient Follow-Up  No future appointments.      CAYETANO Galvan MD

## 2023-10-04 NOTE — PROGRESS NOTES
Physical Therapy                 Therapy Communication Note    Patient Name: Jarocho Luis  MRN: [id number]  Today's Date: 10/4/2023     Discipline: Physical Therapy    Missed Time: Attempt    Missed Visit Reason: PT. Pt. Declined   Comment:  Room: 3120    Pt. Was in bed when I entered. He c/o pain in his knees. He stated his family was on there way in to discuss discharge planning and rehab facilities.    Will see the next available time.

## 2023-10-05 VITALS
WEIGHT: 295.42 LBS | OXYGEN SATURATION: 97 % | HEART RATE: 79 BPM | TEMPERATURE: 97.2 F | RESPIRATION RATE: 18 BRPM | BODY MASS INDEX: 40.01 KG/M2 | DIASTOLIC BLOOD PRESSURE: 67 MMHG | HEIGHT: 72 IN | SYSTOLIC BLOOD PRESSURE: 115 MMHG

## 2023-10-05 LAB
ALBUMIN SERPL BCP-MCNC: 3.4 G/DL (ref 3.4–5)
ANION GAP SERPL CALC-SCNC: 9 MMOL/L (ref 10–20)
BACTERIA BLD CULT: NORMAL
BUN SERPL-MCNC: 15 MG/DL (ref 6–23)
CALCIUM SERPL-MCNC: 9.6 MG/DL (ref 8.6–10.3)
CHLORIDE SERPL-SCNC: 99 MMOL/L (ref 98–107)
CO2 SERPL-SCNC: 29 MMOL/L (ref 21–32)
CREAT SERPL-MCNC: 0.75 MG/DL (ref 0.5–1.3)
ERYTHROCYTE [DISTWIDTH] IN BLOOD BY AUTOMATED COUNT: 12.2 % (ref 11.5–14.5)
GFR SERPL CREATININE-BSD FRML MDRD: >90 ML/MIN/1.73M*2
GLUCOSE SERPL-MCNC: 89 MG/DL (ref 74–99)
HCT VFR BLD AUTO: 40.3 % (ref 41–52)
HGB BLD-MCNC: 13 G/DL (ref 13.5–17.5)
MAGNESIUM SERPL-MCNC: 1.46 MG/DL (ref 1.6–2.4)
MCH RBC QN AUTO: 29.5 PG (ref 26–34)
MCHC RBC AUTO-ENTMCNC: 32.3 G/DL (ref 32–36)
MCV RBC AUTO: 91 FL (ref 80–100)
NRBC BLD-RTO: 0 /100 WBCS (ref 0–0)
PHOSPHATE SERPL-MCNC: 4 MG/DL (ref 2.5–4.9)
PLATELET # BLD AUTO: 218 X10*3/UL (ref 150–450)
PMV BLD AUTO: 10.2 FL (ref 7.5–11.5)
POTASSIUM SERPL-SCNC: 3.6 MMOL/L (ref 3.5–5.3)
RBC # BLD AUTO: 4.41 X10*6/UL (ref 4.5–5.9)
SODIUM SERPL-SCNC: 133 MMOL/L (ref 136–145)
WBC # BLD AUTO: 5.4 X10*3/UL (ref 4.4–11.3)

## 2023-10-05 PROCEDURE — 85027 COMPLETE CBC AUTOMATED: CPT | Performed by: NURSE PRACTITIONER

## 2023-10-05 PROCEDURE — 3490 HC RX 250 GENERAL PHARMACY W/ HCPCS (ALT 636 FOR OP/ED): Performed by: INTERNAL MEDICINE

## 2023-10-05 PROCEDURE — 36415 COLL VENOUS BLD VENIPUNCTURE: CPT | Performed by: PHYSICIAN ASSISTANT

## 2023-10-05 PROCEDURE — 2500000001 HC RX 250 WO HCPCS SELF ADMINISTERED DRUGS (ALT 637 FOR MEDICARE OP): Performed by: INTERNAL MEDICINE

## 2023-10-05 PROCEDURE — 36415 COLL VENOUS BLD VENIPUNCTURE: CPT | Performed by: NURSE PRACTITIONER

## 2023-10-05 PROCEDURE — 94640 AIRWAY INHALATION TREATMENT: CPT

## 2023-10-05 PROCEDURE — 2500000004 HC RX 250 GENERAL PHARMACY W/ HCPCS (ALT 636 FOR OP/ED): Performed by: INTERNAL MEDICINE

## 2023-10-05 PROCEDURE — 83735 ASSAY OF MAGNESIUM: CPT | Performed by: PHYSICIAN ASSISTANT

## 2023-10-05 PROCEDURE — 2500000002 HC RX 250 W HCPCS SELF ADMINISTERED DRUGS (ALT 637 FOR MEDICARE OP, ALT 636 FOR OP/ED): Performed by: INTERNAL MEDICINE

## 2023-10-05 PROCEDURE — 80069 RENAL FUNCTION PANEL: CPT | Performed by: NURSE PRACTITIONER

## 2023-10-05 PROCEDURE — 2500000004 HC RX 250 GENERAL PHARMACY W/ HCPCS (ALT 636 FOR OP/ED): Performed by: PHYSICIAN ASSISTANT

## 2023-10-05 PROCEDURE — S4991 NICOTINE PATCH NONLEGEND: HCPCS | Performed by: INTERNAL MEDICINE

## 2023-10-05 RX ORDER — LANOLIN ALCOHOL/MO/W.PET/CERES
400 CREAM (GRAM) TOPICAL 2 TIMES DAILY
Qty: 60 TABLET | Refills: 0 | Status: SHIPPED | OUTPATIENT
Start: 2023-10-05 | End: 2023-11-04

## 2023-10-05 RX ORDER — LANOLIN ALCOHOL/MO/W.PET/CERES
400 CREAM (GRAM) TOPICAL 2 TIMES DAILY
Status: DISCONTINUED | OUTPATIENT
Start: 2023-10-05 | End: 2023-10-05 | Stop reason: HOSPADM

## 2023-10-05 RX ORDER — MAGNESIUM SULFATE HEPTAHYDRATE 40 MG/ML
2 INJECTION, SOLUTION INTRAVENOUS ONCE
Status: COMPLETED | OUTPATIENT
Start: 2023-10-05 | End: 2023-10-05

## 2023-10-05 RX ADMIN — NICOTINE 1 PATCH: 21 PATCH, EXTENDED RELEASE TRANSDERMAL at 09:13

## 2023-10-05 RX ADMIN — RIVAROXABAN 20 MG: 20 TABLET, FILM COATED ORAL at 16:43

## 2023-10-05 RX ADMIN — Medication 400 MG: at 20:32

## 2023-10-05 RX ADMIN — FLUTICASONE PROPIONATE 1 SPRAY: 50 SPRAY, METERED NASAL at 09:14

## 2023-10-05 RX ADMIN — LORATADINE 10 MG: 10 TABLET ORAL at 09:14

## 2023-10-05 RX ADMIN — FUROSEMIDE 20 MG: 20 TABLET ORAL at 09:14

## 2023-10-05 RX ADMIN — OXYBUTYNIN CHLORIDE 5 MG: 5 TABLET ORAL at 20:27

## 2023-10-05 RX ADMIN — OXYCODONE HYDROCHLORIDE 10 MG: 10 TABLET ORAL at 15:49

## 2023-10-05 RX ADMIN — IPRATROPIUM BROMIDE AND ALBUTEROL SULFATE 3 ML: 2.5; .5 SOLUTION RESPIRATORY (INHALATION) at 08:27

## 2023-10-05 RX ADMIN — PREGABALIN 150 MG: 150 CAPSULE ORAL at 20:27

## 2023-10-05 RX ADMIN — PREGABALIN 150 MG: 150 CAPSULE ORAL at 09:13

## 2023-10-05 RX ADMIN — PANTOPRAZOLE SODIUM 40 MG: 40 TABLET, DELAYED RELEASE ORAL at 09:14

## 2023-10-05 RX ADMIN — ATORVASTATIN CALCIUM 40 MG: 40 TABLET, FILM COATED ORAL at 20:27

## 2023-10-05 RX ADMIN — METOPROLOL SUCCINATE 25 MG: 25 TABLET, EXTENDED RELEASE ORAL at 09:14

## 2023-10-05 RX ADMIN — Medication 400 MG: at 09:14

## 2023-10-05 RX ADMIN — OXYCODONE HYDROCHLORIDE 10 MG: 10 TABLET ORAL at 07:36

## 2023-10-05 RX ADMIN — MAGNESIUM SULFATE HEPTAHYDRATE 2 G: 40 INJECTION, SOLUTION INTRAVENOUS at 12:35

## 2023-10-05 RX ADMIN — IPRATROPIUM BROMIDE AND ALBUTEROL SULFATE 3 ML: 2.5; .5 SOLUTION RESPIRATORY (INHALATION) at 14:54

## 2023-10-05 RX ADMIN — BUDESONIDE 0.5 MG: 0.5 INHALANT RESPIRATORY (INHALATION) at 08:27

## 2023-10-05 RX ADMIN — OXYBUTYNIN CHLORIDE 5 MG: 5 TABLET ORAL at 09:14

## 2023-10-05 ASSESSMENT — COGNITIVE AND FUNCTIONAL STATUS - GENERAL
HELP NEEDED FOR BATHING: A LITTLE
DRESSING REGULAR UPPER BODY CLOTHING: A LITTLE
CLIMB 3 TO 5 STEPS WITH RAILING: A LOT
STANDING UP FROM CHAIR USING ARMS: A LITTLE
TURNING FROM BACK TO SIDE WHILE IN FLAT BAD: A LOT
DRESSING REGULAR LOWER BODY CLOTHING: A LOT
WALKING IN HOSPITAL ROOM: A LOT
MOVING FROM LYING ON BACK TO SITTING ON SIDE OF FLAT BED WITH BEDRAILS: A LITTLE
TOILETING: A LITTLE
MOBILITY SCORE: 14
DAILY ACTIVITIY SCORE: 19
MOVING TO AND FROM BED TO CHAIR: A LOT

## 2023-10-05 ASSESSMENT — PAIN SCALES - GENERAL: PAINLEVEL_OUTOF10: 6

## 2023-10-05 NOTE — PROGRESS NOTES
Jarocho Luis is a 62 y.o. male on day 6 of admission presenting with Weakness.      Subjective   Patient seen today resting comfortably states he is waiting to be discharged       Objective     Last RGENERAL: Alert, no distress, cooperative  SKIN: Skin color, texture, turgor normal. No rashes or lesions.  HEAD/SINUSES: No significant findings  EYES: PE  OROPHARYNX: oral mucosa moist. Oropharynx normal.  NECK: No jugulovenous distention, No carotid bruits, Supple  LUNGS: Lungs clear to auscultation, no wheeze, rhonchi, or rales  CARDIAC: Normal S1 and S2; no rubs, murmurs, or gallops  ABDOMEN: Abdomen soft, non-tender, BS normal, No masses. No abdominal bruits.  EXTREMITIES: Normal exam of the extremities.   NEURO: No focal deficits. Able to move all 4 extremities.  PULSES: 2+ radial, 2 + femoralecorded Vitals  /72 (BP Location: Left arm, Patient Position: Lying)   Pulse 76   Temp 36.4 °C (97.5 °F) (Temporal)   Resp 18   Wt 134 kg (295 lb 6.7 oz)   SpO2 95%   Intake/Output last 3 Shifts:    Intake/Output Summary (Last 24 hours) at 10/5/2023 0936  Last data filed at 10/5/2023 0500  Gross per 24 hour   Intake 240 ml   Output 2100 ml   Net -1860 ml       Admission Weight  Weight: 134 kg (295 lb 13.7 oz) (09/29/23 1507)    Daily Weight  10/03/23 : 134 kg (295 lb 6.7 oz)    Image Results  Results for orders placed or performed during the hospital encounter of 09/29/23 (from the past 24 hour(s))   Magnesium   Result Value Ref Range    Magnesium 1.62 1.60 - 2.40 mg/dL   CBC   Result Value Ref Range    WBC 5.5 4.4 - 11.3 x10*3/uL    nRBC 0.0 0.0 - 0.0 /100 WBCs    RBC 4.51 4.50 - 5.90 x10*6/uL    Hemoglobin 13.2 (L) 13.5 - 17.5 g/dL    Hematocrit 40.7 (L) 41.0 - 52.0 %    MCV 90 80 - 100 fL    MCH 29.3 26.0 - 34.0 pg    MCHC 32.4 32.0 - 36.0 g/dL    RDW 12.2 11.5 - 14.5 %    Platelets 196 150 - 450 x10*3/uL    MPV 10.2 7.5 - 11.5 fL   Renal Function Panel   Result Value Ref Range    Glucose 79 74 - 99 mg/dL     Sodium 133 (L) 136 - 145 mmol/L    Potassium 4.3 3.5 - 5.3 mmol/L    Chloride 100 98 - 107 mmol/L    Bicarbonate 24 21 - 32 mmol/L    Anion Gap 13 10 - 20 mmol/L    Urea Nitrogen 10 6 - 23 mg/dL    Creatinine 0.73 0.50 - 1.30 mg/dL    eGFR >90 >60 mL/min/1.73m*2    Calcium 9.5 8.6 - 10.3 mg/dL    Phosphorus 4.5 2.5 - 4.9 mg/dL    Albumin 3.3 (L) 3.4 - 5.0 g/dL   Basic metabolic panel   Result Value Ref Range    Glucose 80 74 - 99 mg/dL    Sodium 132 (L) 136 - 145 mmol/L    Potassium 4.3 3.5 - 5.3 mmol/L    Chloride 100 98 - 107 mmol/L    Bicarbonate 26 21 - 32 mmol/L    Anion Gap 10 10 - 20 mmol/L    Urea Nitrogen 10 6 - 23 mg/dL    Creatinine 0.76 0.50 - 1.30 mg/dL    eGFR >90 >60 mL/min/1.73m*2    Calcium 9.6 8.6 - 10.3 mg/dL   Scheduled medications  atorvastatin, 40 mg, oral, Nightly  budesonide, 0.5 mg, nebulization, BID  fluticasone, 1 spray, Each Nostril, Daily  furosemide, 20 mg, oral, Daily  ipratropium-albuteroL, 3 mL, nebulization, TID  lidocaine, 1 patch, transdermal, Daily  loratadine, 10 mg, oral, Daily  magnesium oxide, 400 mg, oral, Daily  metoprolol succinate XL, 25 mg, oral, Daily  nicotine, 1 patch, transdermal, Daily  oxybutynin, 5 mg, oral, BID  pantoprazole, 40 mg, oral, Daily before breakfast  pregabalin, 150 mg, oral, BID  rivaroxaban, 20 mg, oral, Daily with evening meal      Continuous medications     PRN medications  PRN medications: acetaminophen, albuterol, oxyCODONE, oxyCODONE, sennosides-docusate sodium  Electrocardiogram 12 Lead  Sinus rhythm with 1st degree AV block  Right axis deviation  Anteroseptal infarct , age undetermined  Abnormal ECG  When compared with ECG of 08-FEB-2020 08:00,  FL interval has increased  QRS axis Shifted right  Anteroseptal infarct is now Present  Nonspecific T wave abnormality now evident in Anterior leads  Confirmed by CAYETANO [id number]) on 9/29/2023 7:39:51 PM      Physical Exam    Relevant Results               Assessment/Plan   This patient currently has  cardiac telemetry ordered; if you would like to modify or discontinue the telemetry order, click here to go to the orders activity to modify/discontinue the order.              Principal Problem:    Weakness  Active Problems:    SIADH (syndrome of inappropriate ADH production) (CMS/HCC)    Patient seen today pending discharge I's and O's reviewed              Zachary Carty MD

## 2023-10-05 NOTE — PROGRESS NOTES
Patient wants a referral sent to [facility], referral sent and accepted. Patient can come this afternoon.     1615 TC Patient notified he will going to [facility] when transportation arranged. Loren Basurto RN, [facility] is working on transport time.

## 2023-10-05 NOTE — PROGRESS NOTES
INPATIENT NEPHROLOGY CONSULT PROGRESS NOTES    Patient Name: Jarocho Luis   MRN: [id number]  CONSULTING SERVICE: Nephrology    Impression :   This is a 62-year-old male with past medical history of hypertension, hyperlipidemia, history of DVT and PE, on Eliquis, lung cancer status post chemo and radiation presented to the hospital with multiple falls and patient was found to have severe hyponatremia and nephrology consulted for further evaluation and management.     1.  Hyponatremia secondary to hypovolemia and poor solute intake, and has some component of SIADH.    2.  Hypomagnesemia.  3.  Anemia.  4.  History of lung cancer status post chemo and radiation.  5.  History of DVT and PE.  6.  Hyperlipidemia.  7.  Hypertension.    Plan.  -Hyponatremia, serum sodium stable around 133.  -Remains on Lasix 20 mg p.o. daily.  -Continue with protein supplementation.  -Blood pressure has been optimally controlled on continue with metoprolol 25 mg p.o. daily.  Thank you for the consultation and will follow with you closely.  Okay to discharge from nephrology standpoint.  ================================================================  INTERVAL HPI: The patient was seen and examined. No acute event overnight.   denies any complaints. PERTINENT ROS:   GENERAL: No fever/chills.  RESPIRATORY: Negative for cough, wheezing or shortness of breath.  CARDIOVASCULAR: Negative for chest pain or palpitations.  GI: Negative for nausea, vomiting,  Diarrhea, abdominal pain.    : Negative for dysuria and hematuria    MEDICATIONS:  Current active Inpatient Medication reviewed.   Current Facility-Administered Medications   Medication Dose Route Frequency Provider Last Rate Last Admin    acetaminophen (Tylenol) tablet 650 mg  650 mg oral q6h PRN Sony Andre MD   650 mg at 10/04/23 2030    albuterol 2.5 mg /3 mL (0.083 %) nebulizer solution 2.5 mg  2.5 mg nebulization q6h PRN Sony Andre MD        atorvastatin (Lipitor) tablet  40 mg  40 mg oral Nightly Sony Andre MD   40 mg at 10/04/23 2027    budesonide (Pulmicort) 0.5 mg/2 mL nebulizer solution 0.5 mg  0.5 mg nebulization BID Sony Andre MD   0.5 mg at 10/05/23 0827    fluticasone (Flonase) nasal spray 1 spray  1 spray Each Nostril Daily Sony Andre MD   1 spray at 10/05/23 0914    furosemide (Lasix) tablet 20 mg  20 mg oral Daily Franklin Frazier MD   20 mg at 10/05/23 0914    ipratropium-albuteroL (Duo-Neb) 0.5-2.5 mg/3 mL nebulizer solution 3 mL  3 mL nebulization TID Sony Andre MD   3 mL at 10/05/23 0827    lidocaine 4 % patch 1 patch  1 patch transdermal Daily Sony Andre MD   1 patch at 10/04/23 0842    loratadine (Claritin) tablet 10 mg  10 mg oral Daily Sony Andre MD   10 mg at 10/05/23 0914    magnesium oxide (Mag-Ox) tablet 400 mg  400 mg oral BID Franklin Frazier MD        magnesium sulfate IV 2 g  2 g intravenous Once Franklin Frazier MD 25 mL/hr at 10/05/23 1235 2 g at 10/05/23 1235    metoprolol succinate XL (Toprol-XL) 24 hr tablet 25 mg  25 mg oral Daily Sony Andre MD   25 mg at 10/05/23 0914    nicotine (Nicoderm CQ) 21 mg/24 hr patch 1 patch  1 patch transdermal Daily Sony Andre MD   1 patch at 10/05/23 0913    oxybutynin (Ditropan) tablet 5 mg  5 mg oral BID Sony Andre MD   5 mg at 10/05/23 0914    oxyCODONE (Roxicodone) immediate release tablet 10 mg  10 mg oral q8h PRN Sony Andre MD   10 mg at 10/03/23 0845    oxyCODONE (Roxicodone) immediate release tablet 10 mg  10 mg oral q8h PRN Sony Andre MD   10 mg at 10/05/23 0736    pantoprazole (ProtoNix) EC tablet 40 mg  40 mg oral Daily before breakfast Sony Andre MD   40 mg at 10/05/23 0914    pregabalin (Lyrica) capsule 150 mg  150 mg oral BID Sony Andre MD   150 mg at 10/05/23 0913    rivaroxaban (Xarelto) tablet 20 mg  20 mg oral Daily with evening meal Sony Andre MD   20 mg at 10/04/23 6440  "   sennosides-docusate sodium (Brenda-Colace) 8.6-50 mg per tablet 2 tablet  2 tablet oral Nightly PRN Sony Andre MD           PHYSICAL EXAM:   /72 (BP Location: Left arm, Patient Position: Lying)   Pulse 76   Temp 36.4 °C (97.5 °F) (Temporal)   Resp 18   Ht 1.828 m (5' 11.97\")   Wt 134 kg (295 lb 6.7 oz)   SpO2 95%   BMI 40.10 kg/m²   Patient Vitals for the past 24 hrs:   BP   10/05/23 0800 144/72   10/05/23 0000 104/58   10/04/23 1900 107/62   10/04/23 1600 134/60       Intake/Output Summary (Last 24 hours) at 10/5/2023 1331  Last data filed at 10/5/2023 0500  Gross per 24 hour   Intake --   Output 2100 ml   Net -2100 ml     GENERAL: Alert, no distress, cooperative  SKIN: Skin color, texture, turgor normal. No rashes or lesions.  HEAD/SINUSES: No significant findings  EYES: PE  OROPHARYNX: oral mucosa moist. Oropharynx normal.  NECK: No jugulovenous distention, No carotid bruits, Supple  LUNGS: Lungs clear to auscultation, no wheeze, rhonchi, or rales  CARDIAC: Normal S1 and S2; no rubs, murmurs, or gallops  ABDOMEN: Abdomen soft, non-tender, BS normal, No masses. No abdominal bruits.  EXTREMITIES: Normal exam of the extremities.   NEURO: No focal deficits. Able to move all 4 extremities.  PULSES: 2+ radial, 2 + femoral    DATA:   Diagnostic tests reviewed for today's visit:        Urobilinogen, Urine   Date Value Ref Range Status   10/02/2023 <2.0 <2.0 mg/dL Final       No components found for: \"PROTTIMED\", \"UALBCR\", \"UPROT\", \"CREAT\"    No results found for: \"CHOL\", \"HDL\", \"LDL\"  IMAGING:  reviewed in  images    SIGNATURE: Franklin Frazier MD  "

## 2023-10-05 NOTE — CARE PLAN
The patient's goals for the shift include      The clinical goals for the shift include  (pt will not get up on his own)no complaints of SOB    Over the shift, the patient did not make progress toward the following goals. Barriers to progression include episodes of confusion.

## 2023-10-05 NOTE — Clinical Note
Jarocho Luis  3120/3120-A     MRN: [id number]  62 y.o. male  Hospital Day: 7 (9/29/2023)  Code Status: Full Code  Isolation Status: No active isolations  Allergies: Gabapentin  Diet: Adult diet Cardiac; 70 gm fat; 2 - 3 grams Sodium Principal Problem:    Weakness  Active Problems:    SIADH (syndrome of inappropriate ADH production) (CMS/[facility])   PCP: No primary care provider on file. None  Treatment Team:   Attending Provider: Sony Andre MD  Charge Nurse: Loren Basurto RN  Registered Nurse: Twila Navarro RN  Registered Nurse: Kelsey Stone RN  : Asuncion Torres LCSW  Dietitian: Jojo Motta RDN, LD  Transitional Care Coordinator: Mike Dumont  Patient Care Technician: Alfonso Nicole  Charge Nurse: Lorene Figueredo RN  Registered Nurse: Samantha Burciaga RN   Labs:  Na: 10/5/2023: 10:44  (L)  K: 10/5/2023: 10:44 AM 3.6  Cl: 10/5/2023: 10:44 AM 99  CO2: 10/5/2023: 10:44 AM 29  BUN: 10/5/2023: 10:44 AM 15  Cr: None  Glc: None  AST: None  ALT: None  WBC: 10/5/2023: 10:44 AM 5.4  Hgb: 10/5/2023: 10:44 AM 13.0 (L)  Hct: 10/5/2023: 10:44 AM 40.3 (L)  Plts: None  Vitals:  Temp:  [36 °C (96.8 °F)-36.4 °C (97.5 °F)] 36.2 °C (97.2 °F)  Heart Rate:  [76-85] 79  Resp:  [18] 18  BP: (104-144)/(58-72) 115/67  I/O:  I/O this shift:  In: 480 [P.O.:480]  Out: 900 [Urine:900]  Vents:     Meds: DCW:    Scheduled Meds: atorvastatin, 40 mg, oral, Nightly  budesonide, 0.5 mg, nebulization, BID  fluticasone, 1 spray, Each Nostril, Daily  furosemide, 20 mg, oral, Daily  ipratropium-albuteroL, 3 mL, nebulization, TID  lidocaine, 1 patch, transdermal, Daily  loratadine, 10 mg, oral, Daily  magnesium oxide, 400 mg, oral, BID  metoprolol succinate XL, 25 mg, oral, Daily  nicotine, 1 patch, transdermal, Daily  oxybutynin, 5 mg, oral, BID  pantoprazole, 40 mg, oral, Daily before breakfast  pregabalin, 150 mg, oral, BID  rivaroxaban, 20 mg, oral, Daily with evening meal      Continuous Infusions:    PRN Meds: PRN  medications: acetaminophen, albuterol, oxyCODONE, oxyCODONE, sennosides-docusate sodium Working Diagnosis:   @DEJA(1007)@  Patient Summary:   @DEJA(1)@  Notes/Comments:   @DEJA(1008)@  Discharge Planning:   @DEJA(1009)@  Primary To Do:   @DEJA(2)@  Coverage To Do:  @DEJA(1000)@

## 2023-10-12 NOTE — CONSULTS
Service:   Service: Nephrology     Consult:  Consult requested by (Attending Name): Sony Andre   Reason: hyponatremia     History of Present Illness:   HPI:    WILLIE GOTTLIEB is a  62-year-old male with past medical history of hypertension, hyperlipidemia, history of DVT and PE, on Eliquis, lung cancer status post chemo and  radiation presented to the hospital with multiple falls and patient was found to have severe hyponatremia and nephrology consulted for further evaluation and management.  Patient reports that he has been having poor oral intake for the last few days and  has been losing weight consistently.  Reports he lost almost 50 to 60 pounds in the last 3 months.  On presentation to the hospital labs significant for WBC count of 4.3, hemoglobin 13.4, platelet count of 176, 6 serum glucose 84, serum sodium 122, potassium  4.0, chloride 91, bicarbonate 26, BUN 6, creatinine 0.6, calcium 9.2, magnesium 1.35, phosphorus 4.2, total protein 6.5, BNP 26, urinalysis positive for blood and negative for any RBCs or WBCs.  CT head and spine negative for any acute intracranial pathology.    Review Family/Social History and ROS:   Social History:    Smoking Status: moderate user (uses 11-30 cig/day,  OR 0.5-1.5 ppd, OR 2-3 cans/pouches loose leaf tobacco per week, OR 0.5-1.5 vape pods per day) (1)   Alcohol Use: occasionally (1)   Drug Use: denies  (1)     All Other Systems: All other systems reviewed and  are negative            Allergies:  ·  No Known Allergies :        Adverse Events:  ·  gabapentin : Swelling/Edema    Objective:     Objective Information:        T   P  R  BP   MAP  SpO2   Value  36.2  95  20  160/75   86  97%  Date/Time 9/29 8:00 9/29 8:00 9/29 8:00 9/29 8:00  9/29 2:10 9/29 8:00  Range  (36.2C - 36.7C )  (95 - 106 )  (18 - 20 )  (123 - 166 )/ (60 - 97 )  (86 - 102 )  (93% - 98% )    Physical Exam by System:    Constitutional: Well developed, awake/alert/oriented  x3, no distress, alert  and cooperative   Eyes: PERRL, EOMI, clear sclera   ENMT: mucous membranes moist, no apparent injury,  no lesions seen   Head/Neck: Neck supple, no apparent injury, thyroid  without mass or tenderness, No JVD, trachea midline, no bruits   Respiratory/Thorax: Patent airways, CTAB, normal  breath sounds with good chest expansion, thorax symmetric   Cardiovascular: Regular, rate and rhythm, no murmurs,  2+ equal pulses of the extremities, normal S 1and S 2   Gastrointestinal: Nondistended, soft, non-tender,  no rebound tenderness or guarding, no masses palpable, no organomegaly, +BS, no bruits   Genitourinary: No Discharge, vesicles or other abnormalities   Musculoskeletal: ROM intact, no joint swelling, normal  strength   Extremities: normal extremities, no cyanosis edema,  contusions or wounds, no clubbing   Neurological: alert and oriented x3, intact senses,  motor, response and reflexes, normal strength   Lymphatic: No significant lymphadenopathy   Psychological: Appropriate mood and behavior   Skin: Warm and dry, no lesions, no rashes     Medications:    Medications:          Continuous Medications       --------------------------------    1. Dextrose 5% in Water Infusion:  1000  mL  IntraVenous  <Continuous>         Scheduled Medications       --------------------------------    1. Albuterol 2.5 mg - Ipratropium 0.5 mg/ 3 mL Neb Soln:  3  mL  Inhalation  Every 6 Hours    2. Atorvastatin:  40  mg  Oral  Daily    3. Budesonide 0.5 mg/ 2 mL Nebulizer Soln:  2  mL  Inhalation  Every 12 Hours    4. Fluticasone 50 microgram/ Nasal Inhalation:  1  spray(s)  Each Nostril  Daily    5. Loratadine:  10  mg  Oral  Daily    6. Metoprolol Succinate Extended Release:  25  mg  Oral  Daily    7. Nicotine  21 mg/ 24 hour TransDermal - PEDS:  1  patch  TransDermal  Every 24 Hours    8. Oxybutynin:  5  mg  Oral  2 Times a Day    9. Pantoprazole:  20  mg  Oral  Daily    10. Pregabalin:  150  mg  Oral  2 Times a Day    11.  Rivaroxaban:  20  mg  Oral  Daily 1800         PRN Medications       --------------------------------    1. Acetaminophen:  650  mg  Oral  Every 6 Hours    2. Albuterol 2.5 mg/ 3 mL Nebulizer Soln:  3  mL  Inhalation  Every 6 Hours    3. Docusate 50 mg - Senna 8.6 m  tablet(s)  Oral  2 Times a Day    4. oxyCODONE Immediate Release:  5  mg  Oral  Every 8 Hours    5. oxyCODONE Immediate Release:  10  mg  Oral  Every 8 Hours    6. Sodium Chloride 0.9% Injectable Flush:  10  mL  IntraVenous Flush  Every 8 Hours and as Needed        Recent Lab Results:    Results:        I have reviewed these laboratory results:    Basic Metabolic Panel  Trending View      Result 29-Sep-2023 11:53:00  29-Sep-2023 07:56:00    Glucose, Serum 80   80       L   129   L    K 3.7   3.7       100    Bicarbonate, Serum 23   22    Anion Gap, Serum 11   11    BUN 3   L   3   L    CREAT 0.60   0.62    GFR Male >90   >90    Calcium, Serum 8.9   9.0        Phosphorus, Serum  29-Sep-2023 07:56:00      Result Value    Phosphorus, Serum  4.2      Troponin I, High Sensitivity  28-Sep-2023 23:40:00      Result Value    Troponin I, High Sensitivity  7      Urinalysis, Microscopic  28-Sep-2023 23:30:00      Result Value    White Cells  0-5    Red Blood Cells  0-5          Assessment:    This is a 62-year-old male with past medical history of hypertension, hyperlipidemia, history of DVT and PE, on Eliquis, lung cancer status post chemo and radiation  presented to the hospital with multiple falls and patient was found to have severe hyponatremia and nephrology consulted for further evaluation and management.     1.  Hyponatremia secondary to hypovolemia and poor solute intake, serum sodium has been improving.  2.  Hypomagnesemia.  3.  Anemia.  4.  History of lung cancer status post chemo and radiation.  5.  History of DVT and PE.  6.  Hyperlipidemia.  7.  Hypertension.    Plan.  -Check a urine sodium, osmolality.  -Serum sodium has been  "corrected somewhat rapidly, by 9 mEq in the last 14 hours.  Will discontinue normal saline and start on D5 water at 100 cc/h.  -Goal is to bring down the sodium to around 128 to 129 mEq.  -Check serum sodium levels every 4 hours.  -Replace with magnesium sulfate for hypomagnesemia.  -Currently on metoprolol 25 mg daily.  -I have discussed with the nursing staff to check serum sodium levels every 4 hours and call me with the results.    Thank you for the consultation and will follow with you closely.    Consult Status:  Consult Status    (select all that apply): initial  consult complete, will follow   Consult Order ID: [id number]       Electronic Signatures:  Franklin Frazier)  (Signed 29-Sep-2023 13:47)   Authored: Service, History of Present Illness, Review  Family/Social History and ROS, Allergies, Objective, Assessment/Recommendations, Note Completion      Last Updated: 29-Sep-2023 13:47 by Franklin Frazier (MD)    References:  1.  Data Referenced From \"Patient Profile - Adult v2\" 29-Sep-2023 02:31   "

## 2023-10-17 NOTE — DOCUMENTATION CLARIFICATION NOTE
PATIENT:               WILLIE GOTTLIEB  ACCT #:                  [id number]  MRN:                       [id number]  :                       1960  ADMIT DATE:       2023 2:04 AM  DISCH DATE:        10/5/2023 9:04 PM  RESPONDING PROVIDER #:        [id number]          PROVIDER RESPONSE TEXT:    Morbid Obesity    CDI QUERY TEXT:    [facility]_BMI High    Instruction:    Based on your assessment of the patient and the clinical information, please provide the requested documentation by clicking on the appropriate radio button and enter any additional information if prompted.    Question: Is there a diagnosis associated with a BMI of 40.1 that is appropriate for this patient:    When answering this query, please exercise your independent professional judgment. The fact that a question is being asked, does not imply that any particular answer is desired or expected.    The patient's clinical indicators include:  Clinical Information:  62 yr old male presenting w/weakness and multiple falls.  Admitted for SIA.    Clinical Indicators:  Nursing flowsheet documentation on  @[id number] indicates BMI 40.16.    Treatment: Weights by nursing.    Risk Factors: Elderly male w/chronic pain, weakness, difficulty walking.  Options provided:  -- Overweight  -- Obesity  -- Morbid Obesity  -- Other - I will add my own diagnosis  -- Refer to Clinical Documentation Reviewer    Query created by: Shima Segundo on 10/13/2023 7:01 AM      Electronically signed by:  CAYETANO LUTHER MD 10/17/2023 7:06 AM

## 2023-12-13 LAB
HOLD SPECIMEN: NORMAL
HOLD SPECIMEN: NORMAL